# Patient Record
Sex: FEMALE | Race: WHITE | NOT HISPANIC OR LATINO | Employment: OTHER | URBAN - METROPOLITAN AREA
[De-identification: names, ages, dates, MRNs, and addresses within clinical notes are randomized per-mention and may not be internally consistent; named-entity substitution may affect disease eponyms.]

---

## 2019-01-01 ENCOUNTER — HOSPITAL ENCOUNTER (OUTPATIENT)
Facility: HOSPITAL | Age: 84
End: 2019-10-11
Attending: HOSPITALIST | Admitting: HOSPITALIST

## 2019-01-01 ENCOUNTER — HOSPITAL ENCOUNTER (OUTPATIENT)
Dept: RADIOLOGY | Facility: HOSPITAL | Age: 84
Discharge: HOME | End: 2019-09-04
Payer: MEDICARE

## 2019-01-01 ENCOUNTER — APPOINTMENT (EMERGENCY)
Dept: RADIOLOGY | Facility: HOSPITAL | Age: 84
DRG: 066 | End: 2019-01-01
Payer: MEDICARE

## 2019-01-01 ENCOUNTER — OFFICE VISIT (OUTPATIENT)
Dept: FAMILY MEDICINE CLINIC | Facility: CLINIC | Age: 84
End: 2019-01-01
Payer: MEDICARE

## 2019-01-01 ENCOUNTER — HOSPITAL ENCOUNTER (INPATIENT)
Facility: HOSPITAL | Age: 84
LOS: 1 days | DRG: 066 | End: 2019-10-11
Admitting: INTERNAL MEDICINE
Payer: MEDICARE

## 2019-01-01 ENCOUNTER — OFFICE VISIT (OUTPATIENT)
Dept: URGENT CARE | Facility: CLINIC | Age: 84
End: 2019-01-01
Payer: MEDICARE

## 2019-01-01 ENCOUNTER — APPOINTMENT (INPATIENT)
Dept: NON INVASIVE DIAGNOSTICS | Facility: HOSPITAL | Age: 84
DRG: 066 | End: 2019-01-01
Payer: MEDICARE

## 2019-01-01 ENCOUNTER — APPOINTMENT (OUTPATIENT)
Dept: LAB | Facility: HOSPITAL | Age: 84
End: 2019-01-01
Payer: MEDICARE

## 2019-01-01 ENCOUNTER — APPOINTMENT (EMERGENCY)
Dept: CT IMAGING | Facility: HOSPITAL | Age: 84
DRG: 066 | End: 2019-01-01
Payer: MEDICARE

## 2019-01-01 ENCOUNTER — TELEPHONE (OUTPATIENT)
Dept: FAMILY MEDICINE CLINIC | Facility: CLINIC | Age: 84
End: 2019-01-01

## 2019-01-01 VITALS
OXYGEN SATURATION: 96 % | SYSTOLIC BLOOD PRESSURE: 147 MMHG | WEIGHT: 140 LBS | HEART RATE: 65 BPM | TEMPERATURE: 98.1 F | DIASTOLIC BLOOD PRESSURE: 71 MMHG | BODY MASS INDEX: 24.8 KG/M2 | HEIGHT: 63 IN | RESPIRATION RATE: 16 BRPM

## 2019-01-01 VITALS
SYSTOLIC BLOOD PRESSURE: 130 MMHG | BODY MASS INDEX: 25.52 KG/M2 | WEIGHT: 144 LBS | HEIGHT: 63 IN | DIASTOLIC BLOOD PRESSURE: 68 MMHG

## 2019-01-01 VITALS
RESPIRATION RATE: 24 BRPM | OXYGEN SATURATION: 97 % | BODY MASS INDEX: 25.52 KG/M2 | SYSTOLIC BLOOD PRESSURE: 114 MMHG | HEIGHT: 63 IN | TEMPERATURE: 97.1 F | HEART RATE: 68 BPM | DIASTOLIC BLOOD PRESSURE: 70 MMHG | WEIGHT: 144 LBS

## 2019-01-01 VITALS
SYSTOLIC BLOOD PRESSURE: 119 MMHG | DIASTOLIC BLOOD PRESSURE: 72 MMHG | HEIGHT: 63 IN | OXYGEN SATURATION: 99 % | WEIGHT: 141.4 LBS | BODY MASS INDEX: 25.05 KG/M2 | HEART RATE: 65 BPM

## 2019-01-01 VITALS
SYSTOLIC BLOOD PRESSURE: 94 MMHG | TEMPERATURE: 99.2 F | OXYGEN SATURATION: 90 % | HEIGHT: 63 IN | HEART RATE: 62 BPM | WEIGHT: 140.21 LBS | RESPIRATION RATE: 24 BRPM | BODY MASS INDEX: 24.84 KG/M2 | DIASTOLIC BLOOD PRESSURE: 58 MMHG

## 2019-01-01 VITALS
BODY MASS INDEX: 25.45 KG/M2 | OXYGEN SATURATION: 98 % | SYSTOLIC BLOOD PRESSURE: 124 MMHG | TEMPERATURE: 98.9 F | HEIGHT: 63 IN | WEIGHT: 143.6 LBS | DIASTOLIC BLOOD PRESSURE: 72 MMHG | HEART RATE: 65 BPM

## 2019-01-01 DIAGNOSIS — E87.6 HYPOKALEMIA: ICD-10-CM

## 2019-01-01 DIAGNOSIS — R05.9 COUGH: ICD-10-CM

## 2019-01-01 DIAGNOSIS — E78.2 MIXED HYPERLIPIDEMIA: ICD-10-CM

## 2019-01-01 DIAGNOSIS — E11.9 TYPE 2 DIABETES MELLITUS WITHOUT COMPLICATION, WITHOUT LONG-TERM CURRENT USE OF INSULIN (HCC): Primary | ICD-10-CM

## 2019-01-01 DIAGNOSIS — J20.8 ACUTE BACTERIAL BRONCHITIS: Primary | ICD-10-CM

## 2019-01-01 DIAGNOSIS — Z51.5 END OF LIFE CARE: Primary | ICD-10-CM

## 2019-01-01 DIAGNOSIS — B34.9 VIRAL ILLNESS: Primary | ICD-10-CM

## 2019-01-01 DIAGNOSIS — B96.89 ACUTE BACTERIAL BRONCHITIS: Primary | ICD-10-CM

## 2019-01-01 DIAGNOSIS — B34.9 VIRAL SYNDROME: ICD-10-CM

## 2019-01-01 DIAGNOSIS — E06.3 HASHIMOTO'S THYROIDITIS: Primary | ICD-10-CM

## 2019-01-01 DIAGNOSIS — I10 ESSENTIAL HYPERTENSION, BENIGN: Primary | ICD-10-CM

## 2019-01-01 DIAGNOSIS — I63.9 STROKE (CEREBRUM) (HCC): Primary | ICD-10-CM

## 2019-01-01 DIAGNOSIS — E11.9 TYPE 2 DIABETES MELLITUS WITHOUT COMPLICATION, WITHOUT LONG-TERM CURRENT USE OF INSULIN (HCC): ICD-10-CM

## 2019-01-01 DIAGNOSIS — E78.2 MIXED HYPERLIPIDEMIA: Primary | ICD-10-CM

## 2019-01-01 DIAGNOSIS — R10.84 GENERALIZED ABDOMINAL PAIN: ICD-10-CM

## 2019-01-01 DIAGNOSIS — E87.6 HYPOKALEMIA: Primary | ICD-10-CM

## 2019-01-01 DIAGNOSIS — I10 ESSENTIAL HYPERTENSION, BENIGN: ICD-10-CM

## 2019-01-01 DIAGNOSIS — E83.42 HYPOMAGNESEMIA: ICD-10-CM

## 2019-01-01 LAB
ALBUMIN SERPL BCP-MCNC: 3 G/DL (ref 3.5–5.7)
ALBUMIN SERPL BCP-MCNC: 3.1 G/DL (ref 3.5–5.7)
ALBUMIN SERPL BCP-MCNC: 3.8 G/DL (ref 3.5–5.7)
ALP SERPL-CCNC: 114 U/L (ref 55–165)
ALP SERPL-CCNC: 78 U/L (ref 55–165)
ALP SERPL-CCNC: 80 U/L (ref 55–165)
ALT SERPL W P-5'-P-CCNC: 17 U/L (ref 7–52)
ALT SERPL W P-5'-P-CCNC: 20 U/L (ref 7–52)
ALT SERPL W P-5'-P-CCNC: 30 U/L (ref 7–52)
AMYLASE SERPL-CCNC: 32 IU/L (ref 29–103)
ANION GAP SERPL CALCULATED.3IONS-SCNC: 10 MMOL/L (ref 4–13)
ANION GAP SERPL CALCULATED.3IONS-SCNC: 12 MMOL/L (ref 4–13)
ANION GAP SERPL CALCULATED.3IONS-SCNC: 8 MMOL/L (ref 4–13)
ANION GAP SERPL CALCULATED.3IONS-SCNC: 9 MMOL/L (ref 4–13)
APTT PPP: 31 SECONDS (ref 23–37)
AST SERPL W P-5'-P-CCNC: 19 U/L (ref 13–39)
AST SERPL W P-5'-P-CCNC: 24 U/L (ref 13–39)
AST SERPL W P-5'-P-CCNC: 24 U/L (ref 13–39)
ATRIAL RATE: 74 BPM
BACTERIA UR QL AUTO: ABNORMAL /HPF
BASOPHILS # BLD AUTO: 0 THOUSANDS/ΜL (ref 0–0.1)
BASOPHILS # BLD AUTO: 0 THOUSANDS/ΜL (ref 0–0.1)
BASOPHILS # BLD AUTO: 0.1 THOUSANDS/ΜL (ref 0–0.1)
BASOPHILS NFR BLD AUTO: 0 % (ref 0–2)
BILIRUB SERPL-MCNC: 0.6 MG/DL (ref 0.2–1)
BILIRUB SERPL-MCNC: 0.6 MG/DL (ref 0.2–1)
BILIRUB SERPL-MCNC: 0.7 MG/DL (ref 0.2–1)
BILIRUB UR QL STRIP: ABNORMAL
BILIRUB UR QL STRIP: NEGATIVE
BUN SERPL-MCNC: 14 MG/DL (ref 7–25)
BUN SERPL-MCNC: 14 MG/DL (ref 7–25)
BUN SERPL-MCNC: 25 MG/DL (ref 7–25)
BUN SERPL-MCNC: 25 MG/DL (ref 7–25)
CALCIUM SERPL-MCNC: 8.7 MG/DL (ref 8.6–10.5)
CALCIUM SERPL-MCNC: 9.2 MG/DL (ref 8.6–10.5)
CALCIUM SERPL-MCNC: 9.3 MG/DL (ref 8.6–10.5)
CALCIUM SERPL-MCNC: 9.5 MG/DL (ref 8.6–10.5)
CHLORIDE SERPL-SCNC: 102 MMOL/L (ref 98–107)
CHLORIDE SERPL-SCNC: 108 MMOL/L (ref 98–107)
CHLORIDE SERPL-SCNC: 89 MMOL/L (ref 98–107)
CHLORIDE SERPL-SCNC: 94 MMOL/L (ref 98–107)
CHOLEST SERPL-MCNC: 79 MG/DL (ref 0–200)
CLARITY UR: CLEAR
CLARITY UR: CLEAR
CO2 SERPL-SCNC: 18 MMOL/L (ref 21–31)
CO2 SERPL-SCNC: 24 MMOL/L (ref 21–31)
CO2 SERPL-SCNC: 24 MMOL/L (ref 21–31)
CO2 SERPL-SCNC: 26 MMOL/L (ref 21–31)
COLOR UR: YELLOW
COLOR UR: YELLOW
CREAT SERPL-MCNC: 0.75 MG/DL (ref 0.6–1.2)
CREAT SERPL-MCNC: 0.82 MG/DL (ref 0.6–1.2)
CREAT SERPL-MCNC: 0.91 MG/DL (ref 0.6–1.2)
CREAT SERPL-MCNC: 0.93 MG/DL (ref 0.6–1.2)
CREAT UR-MCNC: 20.7 MG/DL
EOSINOPHIL # BLD AUTO: 0.1 THOUSAND/ΜL (ref 0–0.61)
EOSINOPHIL NFR BLD AUTO: 1 % (ref 0–5)
ERYTHROCYTE [DISTWIDTH] IN BLOOD BY AUTOMATED COUNT: 13.5 % (ref 11.5–14.5)
ERYTHROCYTE [DISTWIDTH] IN BLOOD BY AUTOMATED COUNT: 14.1 % (ref 11.5–14.5)
ERYTHROCYTE [DISTWIDTH] IN BLOOD BY AUTOMATED COUNT: 14.5 % (ref 11.5–14.5)
EST. AVERAGE GLUCOSE BLD GHB EST-MCNC: 171 MG/DL
EST. AVERAGE GLUCOSE BLD GHB EST-MCNC: 183 MG/DL
GFR SERPL CREATININE-BSD FRML MDRD: 54 ML/MIN/1.73SQ M
GFR SERPL CREATININE-BSD FRML MDRD: 56 ML/MIN/1.73SQ M
GFR SERPL CREATININE-BSD FRML MDRD: 63 ML/MIN/1.73SQ M
GFR SERPL CREATININE-BSD FRML MDRD: 70 ML/MIN/1.73SQ M
GLUCOSE SERPL-MCNC: 141 MG/DL (ref 65–140)
GLUCOSE SERPL-MCNC: 144 MG/DL (ref 65–140)
GLUCOSE SERPL-MCNC: 154 MG/DL (ref 65–140)
GLUCOSE SERPL-MCNC: 164 MG/DL (ref 65–99)
GLUCOSE SERPL-MCNC: 165 MG/DL (ref 65–140)
GLUCOSE SERPL-MCNC: 169 MG/DL (ref 65–99)
GLUCOSE SERPL-MCNC: 179 MG/DL (ref 65–140)
GLUCOSE SERPL-MCNC: 223 MG/DL (ref 65–99)
GLUCOSE SERPL-MCNC: 261 MG/DL (ref 65–99)
GLUCOSE UR STRIP-MCNC: ABNORMAL MG/DL
GLUCOSE UR STRIP-MCNC: NEGATIVE MG/DL
HBA1C MFR BLD HPLC: 8.4 %
HBA1C MFR BLD: 7.6 % (ref 4.2–6.3)
HBA1C MFR BLD: 8 % (ref 4.2–6.3)
HCT VFR BLD AUTO: 36.2 % (ref 42–47)
HCT VFR BLD AUTO: 36.7 % (ref 42–47)
HCT VFR BLD AUTO: 39.2 % (ref 42–47)
HDLC SERPL-MCNC: 22 MG/DL (ref 40–60)
HGB BLD-MCNC: 11.8 G/DL (ref 12–16)
HGB BLD-MCNC: 12.1 G/DL (ref 12–16)
HGB BLD-MCNC: 13 G/DL (ref 12–16)
HGB UR QL STRIP.AUTO: ABNORMAL
HGB UR QL STRIP.AUTO: NEGATIVE
INR PPP: 1.32 (ref 0.9–1.5)
INR PPP: 1.36 (ref 0.9–1.5)
KETONES UR STRIP-MCNC: ABNORMAL MG/DL
KETONES UR STRIP-MCNC: NEGATIVE MG/DL
LACTATE SERPL-SCNC: 1.8 MMOL/L (ref 0.5–2)
LDLC SERPL CALC-MCNC: 38 MG/DL (ref 0–100)
LEUKOCYTE ESTERASE UR QL STRIP: NEGATIVE
LEUKOCYTE ESTERASE UR QL STRIP: NEGATIVE
LIPASE SERPL-CCNC: 11 U/L (ref 11–82)
LYMPHOCYTES # BLD AUTO: 0.9 THOUSANDS/ΜL (ref 0.6–4.47)
LYMPHOCYTES # BLD AUTO: 1.2 THOUSANDS/ΜL (ref 0.6–4.47)
LYMPHOCYTES # BLD AUTO: 1.3 THOUSANDS/ΜL (ref 0.6–4.47)
LYMPHOCYTES NFR BLD AUTO: 10 % (ref 21–51)
LYMPHOCYTES NFR BLD AUTO: 7 % (ref 21–51)
LYMPHOCYTES NFR BLD AUTO: 9 % (ref 21–51)
MAGNESIUM SERPL-MCNC: 2 MG/DL (ref 1.9–2.7)
MCH RBC QN AUTO: 28.4 PG (ref 26–34)
MCH RBC QN AUTO: 28.9 PG (ref 26–34)
MCH RBC QN AUTO: 29.9 PG (ref 26–34)
MCHC RBC AUTO-ENTMCNC: 32.5 G/DL (ref 31–37)
MCHC RBC AUTO-ENTMCNC: 33 G/DL (ref 31–37)
MCHC RBC AUTO-ENTMCNC: 33.2 G/DL (ref 31–37)
MCV RBC AUTO: 87 FL (ref 81–99)
MCV RBC AUTO: 88 FL (ref 81–99)
MCV RBC AUTO: 90 FL (ref 81–99)
MICROALBUMIN UR-MCNC: 19.2 MG/L (ref 0–20)
MICROALBUMIN/CREAT 24H UR: 93 MG/G CREATININE (ref 0–30)
MONOCYTES # BLD AUTO: 0.7 THOUSAND/ΜL (ref 0.17–1.22)
MONOCYTES # BLD AUTO: 0.7 THOUSAND/ΜL (ref 0.17–1.22)
MONOCYTES # BLD AUTO: 1.1 THOUSAND/ΜL (ref 0.17–1.22)
MONOCYTES NFR BLD AUTO: 5 % (ref 2–12)
MONOCYTES NFR BLD AUTO: 6 % (ref 2–12)
MONOCYTES NFR BLD AUTO: 7 % (ref 2–12)
MUCOUS THREADS UR QL AUTO: ABNORMAL
NEUTROPHILS # BLD AUTO: 11.1 THOUSANDS/ΜL (ref 1.4–6.5)
NEUTROPHILS # BLD AUTO: 11.9 THOUSANDS/ΜL (ref 1.4–6.5)
NEUTROPHILS # BLD AUTO: 9.6 THOUSANDS/ΜL (ref 1.4–6.5)
NEUTS SEG NFR BLD AUTO: 83 % (ref 42–75)
NEUTS SEG NFR BLD AUTO: 83 % (ref 42–75)
NEUTS SEG NFR BLD AUTO: 87 % (ref 42–75)
NITRITE UR QL STRIP: NEGATIVE
NITRITE UR QL STRIP: NEGATIVE
NON-SQ EPI CELLS URNS QL MICRO: ABNORMAL /HPF
OTHER STN SPEC: ABNORMAL
P AXIS: 69 DEGREES
PH UR STRIP.AUTO: 5 [PH]
PH UR STRIP.AUTO: 6 [PH]
PLATELET # BLD AUTO: 340 THOUSANDS/UL (ref 149–390)
PLATELET # BLD AUTO: 342 THOUSANDS/UL (ref 149–390)
PLATELET # BLD AUTO: 350 THOUSANDS/UL (ref 149–390)
PMV BLD AUTO: 7.3 FL (ref 8.6–11.7)
PMV BLD AUTO: 7.9 FL (ref 8.6–11.7)
PMV BLD AUTO: 8.1 FL (ref 8.6–11.7)
POTASSIUM SERPL-SCNC: 3.4 MMOL/L (ref 3.5–5.5)
POTASSIUM SERPL-SCNC: 3.8 MMOL/L (ref 3.5–5.5)
POTASSIUM SERPL-SCNC: 4 MMOL/L (ref 3.5–5.5)
POTASSIUM SERPL-SCNC: 4.3 MMOL/L (ref 3.5–5.5)
PR INTERVAL: 166 MS
PROCALCITONIN SERPL-MCNC: 0.72 NG/ML
PROT SERPL-MCNC: 6.6 G/DL (ref 6.4–8.9)
PROT SERPL-MCNC: 7.1 G/DL (ref 6.4–8.9)
PROT SERPL-MCNC: 7.4 G/DL (ref 6.4–8.9)
PROT UR STRIP-MCNC: ABNORMAL MG/DL
PROT UR STRIP-MCNC: NEGATIVE MG/DL
PROTHROMBIN TIME: 15.4 SECONDS (ref 10.2–13)
PROTHROMBIN TIME: 15.8 SECONDS (ref 10.2–13)
QRS AXIS: 77 DEGREES
QRSD INTERVAL: 74 MS
QT INTERVAL: 398 MS
QTC INTERVAL: 441 MS
RBC # BLD AUTO: 4.14 MILLION/UL (ref 3.9–5.2)
RBC # BLD AUTO: 4.18 MILLION/UL (ref 3.9–5.2)
RBC # BLD AUTO: 4.35 MILLION/UL (ref 3.9–5.2)
RBC #/AREA URNS AUTO: ABNORMAL /HPF
SODIUM SERPL-SCNC: 123 MMOL/L (ref 134–143)
SODIUM SERPL-SCNC: 128 MMOL/L (ref 134–143)
SODIUM SERPL-SCNC: 135 MMOL/L (ref 134–143)
SODIUM SERPL-SCNC: 138 MMOL/L (ref 134–143)
SP GR UR STRIP.AUTO: 1.01 (ref 1–1.03)
SP GR UR STRIP.AUTO: 1.02 (ref 1–1.03)
T WAVE AXIS: 85 DEGREES
TRIGL SERPL-MCNC: 95 MG/DL (ref 44–166)
TSH SERPL DL<=0.05 MIU/L-ACNC: 3.6 UIU/ML (ref 0.45–5.33)
UROBILINOGEN UR QL STRIP.AUTO: 1 E.U./DL
UROBILINOGEN UR QL STRIP.AUTO: 1 E.U./DL
VENTRICULAR RATE: 74 BPM
WBC # BLD AUTO: 11.6 THOUSAND/UL (ref 4.8–10.8)
WBC # BLD AUTO: 12.8 THOUSAND/UL (ref 4.8–10.8)
WBC # BLD AUTO: 14.4 THOUSAND/UL (ref 4.8–10.8)
WBC #/AREA URNS AUTO: ABNORMAL /HPF

## 2019-01-01 PROCEDURE — 80061 LIPID PANEL: CPT | Performed by: NURSE PRACTITIONER

## 2019-01-01 PROCEDURE — 80048 BASIC METABOLIC PNL TOTAL CA: CPT

## 2019-01-01 PROCEDURE — 83735 ASSAY OF MAGNESIUM: CPT

## 2019-01-01 PROCEDURE — 83036 HEMOGLOBIN GLYCOSYLATED A1C: CPT

## 2019-01-01 PROCEDURE — 70450 CT HEAD/BRAIN W/O DYE: CPT

## 2019-01-01 PROCEDURE — 97530 THERAPEUTIC ACTIVITIES: CPT

## 2019-01-01 PROCEDURE — 84443 ASSAY THYROID STIM HORMONE: CPT | Performed by: NURSE PRACTITIONER

## 2019-01-01 PROCEDURE — G8988 SELF CARE GOAL STATUS: HCPCS

## 2019-01-01 PROCEDURE — 96360 HYDRATION IV INFUSION INIT: CPT

## 2019-01-01 PROCEDURE — 82570 ASSAY OF URINE CREATININE: CPT | Performed by: FAMILY MEDICINE

## 2019-01-01 PROCEDURE — 80053 COMPREHEN METABOLIC PANEL: CPT | Performed by: NURSE PRACTITIONER

## 2019-01-01 PROCEDURE — 81003 URINALYSIS AUTO W/O SCOPE: CPT | Performed by: FAMILY MEDICINE

## 2019-01-01 PROCEDURE — 84145 PROCALCITONIN (PCT): CPT | Performed by: PHYSICIAN ASSISTANT

## 2019-01-01 PROCEDURE — 85025 COMPLETE CBC W/AUTO DIFF WBC: CPT

## 2019-01-01 PROCEDURE — 71045 X-RAY EXAM CHEST 1 VIEW: CPT

## 2019-01-01 PROCEDURE — 36415 COLL VENOUS BLD VENIPUNCTURE: CPT

## 2019-01-01 PROCEDURE — 94664 DEMO&/EVAL PT USE INHALER: CPT

## 2019-01-01 PROCEDURE — 99213 OFFICE O/P EST LOW 20 MIN: CPT | Performed by: FAMILY MEDICINE

## 2019-01-01 PROCEDURE — 85610 PROTHROMBIN TIME: CPT | Performed by: NURSE PRACTITIONER

## 2019-01-01 PROCEDURE — 82948 REAGENT STRIP/BLOOD GLUCOSE: CPT

## 2019-01-01 PROCEDURE — 93306 TTE W/DOPPLER COMPLETE: CPT | Performed by: INTERNAL MEDICINE

## 2019-01-01 PROCEDURE — 82150 ASSAY OF AMYLASE: CPT

## 2019-01-01 PROCEDURE — 80053 COMPREHEN METABOLIC PANEL: CPT

## 2019-01-01 PROCEDURE — 82043 UR ALBUMIN QUANTITATIVE: CPT | Performed by: FAMILY MEDICINE

## 2019-01-01 PROCEDURE — 81001 URINALYSIS AUTO W/SCOPE: CPT | Performed by: NURSE PRACTITIONER

## 2019-01-01 PROCEDURE — 83036 HEMOGLOBIN GLYCOSYLATED A1C: CPT | Performed by: NURSE PRACTITIONER

## 2019-01-01 PROCEDURE — 99221 1ST HOSP IP/OBS SF/LOW 40: CPT | Performed by: NURSE PRACTITIONER

## 2019-01-01 PROCEDURE — 71046 X-RAY EXAM CHEST 2 VIEWS: CPT

## 2019-01-01 PROCEDURE — 99213 OFFICE O/P EST LOW 20 MIN: CPT | Performed by: PHYSICIAN ASSISTANT

## 2019-01-01 PROCEDURE — G8979 MOBILITY GOAL STATUS: HCPCS

## 2019-01-01 PROCEDURE — 87086 URINE CULTURE/COLONY COUNT: CPT | Performed by: NURSE PRACTITIONER

## 2019-01-01 PROCEDURE — 99239 HOSP IP/OBS DSCHRG MGMT >30: CPT | Performed by: NURSE PRACTITIONER

## 2019-01-01 PROCEDURE — 94760 N-INVAS EAR/PLS OXIMETRY 1: CPT

## 2019-01-01 PROCEDURE — 99203 OFFICE O/P NEW LOW 30 MIN: CPT | Performed by: PHYSICIAN ASSISTANT

## 2019-01-01 PROCEDURE — 83605 ASSAY OF LACTIC ACID: CPT

## 2019-01-01 PROCEDURE — 93005 ELECTROCARDIOGRAM TRACING: CPT

## 2019-01-01 PROCEDURE — 87040 BLOOD CULTURE FOR BACTERIA: CPT

## 2019-01-01 PROCEDURE — 99238 HOSP IP/OBS DSCHRG MGMT 30/<: CPT | Performed by: NURSE PRACTITIONER

## 2019-01-01 PROCEDURE — G8978 MOBILITY CURRENT STATUS: HCPCS

## 2019-01-01 PROCEDURE — 99285 EMERGENCY DEPT VISIT HI MDM: CPT

## 2019-01-01 PROCEDURE — 99223 1ST HOSP IP/OBS HIGH 75: CPT | Performed by: NURSE PRACTITIONER

## 2019-01-01 PROCEDURE — 93010 ELECTROCARDIOGRAM REPORT: CPT | Performed by: INTERNAL MEDICINE

## 2019-01-01 PROCEDURE — 99233 SBSQ HOSP IP/OBS HIGH 50: CPT | Performed by: HOSPITALIST

## 2019-01-01 PROCEDURE — 85610 PROTHROMBIN TIME: CPT

## 2019-01-01 PROCEDURE — 97163 PT EVAL HIGH COMPLEX 45 MIN: CPT

## 2019-01-01 PROCEDURE — G0463 HOSPITAL OUTPT CLINIC VISIT: HCPCS | Performed by: PHYSICIAN ASSISTANT

## 2019-01-01 PROCEDURE — 83690 ASSAY OF LIPASE: CPT

## 2019-01-01 PROCEDURE — G8987 SELF CARE CURRENT STATUS: HCPCS

## 2019-01-01 PROCEDURE — 85730 THROMBOPLASTIN TIME PARTIAL: CPT

## 2019-01-01 PROCEDURE — 85025 COMPLETE CBC W/AUTO DIFF WBC: CPT | Performed by: NURSE PRACTITIONER

## 2019-01-01 PROCEDURE — 93306 TTE W/DOPPLER COMPLETE: CPT

## 2019-01-01 PROCEDURE — 97167 OT EVAL HIGH COMPLEX 60 MIN: CPT

## 2019-01-01 RX ORDER — SCOLOPAMINE TRANSDERMAL SYSTEM 1 MG/1
1 PATCH, EXTENDED RELEASE TRANSDERMAL
Status: DISCONTINUED | OUTPATIENT
Start: 2019-01-01 | End: 2019-10-12 | Stop reason: HOSPADM

## 2019-01-01 RX ORDER — HALOPERIDOL 5 MG/ML
5 INJECTION INTRAMUSCULAR EVERY 6 HOURS
Qty: 1 ML | Refills: 0
Start: 2019-01-01

## 2019-01-01 RX ORDER — HALOPERIDOL 5 MG/ML
1 INJECTION INTRAMUSCULAR
Status: DISCONTINUED | OUTPATIENT
Start: 2019-01-01 | End: 2019-10-12 | Stop reason: HOSPADM

## 2019-01-01 RX ORDER — HALOPERIDOL 5 MG/ML
5 INJECTION INTRAMUSCULAR EVERY 6 HOURS
Status: DISCONTINUED | OUTPATIENT
Start: 2019-01-01 | End: 2019-10-12 | Stop reason: HOSPADM

## 2019-01-01 RX ORDER — ASPIRIN 81 MG/1
81 TABLET, CHEWABLE ORAL DAILY
Status: DISCONTINUED | OUTPATIENT
Start: 2019-01-01 | End: 2019-01-01

## 2019-01-01 RX ORDER — LORAZEPAM 2 MG/ML
2 INJECTION INTRAMUSCULAR
Refills: 0
Start: 2019-01-01 | End: 2019-10-21

## 2019-01-01 RX ORDER — PRAVASTATIN SODIUM 80 MG/1
TABLET ORAL
Qty: 30 TABLET | Refills: 5 | Status: SHIPPED | OUTPATIENT
Start: 2019-01-01 | End: 2019-01-01 | Stop reason: HOSPADM

## 2019-01-01 RX ORDER — ATORVASTATIN CALCIUM 40 MG/1
40 TABLET, FILM COATED ORAL EVERY EVENING
Status: DISCONTINUED | OUTPATIENT
Start: 2019-01-01 | End: 2019-01-01

## 2019-01-01 RX ORDER — HEPARIN SODIUM 5000 [USP'U]/ML
5000 INJECTION, SOLUTION INTRAVENOUS; SUBCUTANEOUS EVERY 8 HOURS SCHEDULED
Status: DISCONTINUED | OUTPATIENT
Start: 2019-01-01 | End: 2019-01-01

## 2019-01-01 RX ORDER — OLMESARTAN MEDOXOMIL AND HYDROCHLOROTHIAZIDE 20/12.5 20; 12.5 MG/1; MG/1
TABLET ORAL
Qty: 30 TABLET | Refills: 5 | Status: SHIPPED | OUTPATIENT
Start: 2019-01-01 | End: 2019-01-01 | Stop reason: DRUGHIGH

## 2019-01-01 RX ORDER — OLMESARTAN MEDOXOMIL AND HYDROCHLOROTHIAZIDE 20/12.5 20; 12.5 MG/1; MG/1
TABLET ORAL
Refills: 4 | COMMUNITY
Start: 2019-01-01 | End: 2019-01-01 | Stop reason: SDUPTHER

## 2019-01-01 RX ORDER — NETARSUDIL 0.2 MG/ML
1 SOLUTION/ DROPS OPHTHALMIC; TOPICAL ONCE
Refills: 3 | COMMUNITY
Start: 2019-01-01 | End: 2019-01-01 | Stop reason: HOSPADM

## 2019-01-01 RX ORDER — LORAZEPAM 2 MG/ML
2 INJECTION INTRAMUSCULAR
Status: DISCONTINUED | OUTPATIENT
Start: 2019-01-01 | End: 2019-01-01 | Stop reason: HOSPADM

## 2019-01-01 RX ORDER — ASPIRIN 300 MG/1
300 SUPPOSITORY RECTAL ONCE
Status: COMPLETED | OUTPATIENT
Start: 2019-01-01 | End: 2019-01-01

## 2019-01-01 RX ORDER — ACETAMINOPHEN 325 MG/1
650 TABLET ORAL EVERY 6 HOURS PRN
Status: DISCONTINUED | OUTPATIENT
Start: 2019-01-01 | End: 2019-01-01

## 2019-01-01 RX ORDER — AZITHROMYCIN 250 MG/1
TABLET, FILM COATED ORAL
Qty: 6 TABLET | Refills: 0 | Status: SHIPPED | OUTPATIENT
Start: 2019-01-01 | End: 2019-01-01

## 2019-01-01 RX ORDER — SCOLOPAMINE TRANSDERMAL SYSTEM 1 MG/1
1 PATCH, EXTENDED RELEASE TRANSDERMAL
Refills: 0
Start: 2019-10-14

## 2019-01-01 RX ORDER — POTASSIUM CHLORIDE 750 MG/1
10 TABLET, EXTENDED RELEASE ORAL DAILY
Refills: 5 | Status: ON HOLD | COMMUNITY
Start: 2019-01-01 | End: 2019-01-01

## 2019-01-01 RX ORDER — HALOPERIDOL 5 MG/ML
1 INJECTION INTRAMUSCULAR
Qty: 1 ML | Refills: 0
Start: 2019-01-01

## 2019-01-01 RX ORDER — PREDNISOLONE ACETATE 10 MG/ML
SUSPENSION/ DROPS OPHTHALMIC
Refills: 3 | COMMUNITY
Start: 2019-01-01 | End: 2019-01-01 | Stop reason: ALTCHOICE

## 2019-01-01 RX ORDER — LEVOTHYROXINE SODIUM 0.07 MG/1
TABLET ORAL
Refills: 4 | COMMUNITY
Start: 2019-01-01 | End: 2019-01-01 | Stop reason: SDUPTHER

## 2019-01-01 RX ORDER — POTASSIUM CHLORIDE 750 MG/1
10 TABLET, EXTENDED RELEASE ORAL DAILY
Status: DISCONTINUED | OUTPATIENT
Start: 2019-01-01 | End: 2019-01-01

## 2019-01-01 RX ORDER — BIMATOPROST 0.01 %
1 DROPS OPHTHALMIC (EYE)
Refills: 3 | COMMUNITY
Start: 2019-01-01 | End: 2019-01-01 | Stop reason: HOSPADM

## 2019-01-01 RX ORDER — GATIFLOXACIN 5 MG/ML
SOLUTION/ DROPS OPHTHALMIC
Refills: 2 | COMMUNITY
Start: 2019-01-01 | End: 2019-01-01 | Stop reason: ALTCHOICE

## 2019-01-01 RX ORDER — MORPHINE SULFATE 4 MG/ML
4 INJECTION, SOLUTION INTRAMUSCULAR; INTRAVENOUS EVERY 4 HOURS
Status: DISCONTINUED | OUTPATIENT
Start: 2019-01-01 | End: 2019-10-12 | Stop reason: HOSPADM

## 2019-01-01 RX ORDER — POTASSIUM CHLORIDE 750 MG/1
TABLET, EXTENDED RELEASE ORAL
Qty: 30 TABLET | Refills: 5 | Status: SHIPPED | OUTPATIENT
Start: 2019-01-01 | End: 2019-01-01 | Stop reason: SDUPTHER

## 2019-01-01 RX ORDER — PRAVASTATIN SODIUM 80 MG/1
80 TABLET ORAL DAILY
Refills: 4 | COMMUNITY
Start: 2019-01-01 | End: 2019-01-01 | Stop reason: SDUPTHER

## 2019-01-01 RX ORDER — BRIMONIDINE TARTRATE 0.15 %
1 DROPS OPHTHALMIC (EYE) 3 TIMES DAILY
Status: DISCONTINUED | OUTPATIENT
Start: 2019-01-01 | End: 2019-01-01

## 2019-01-01 RX ORDER — SCOLOPAMINE TRANSDERMAL SYSTEM 1 MG/1
1 PATCH, EXTENDED RELEASE TRANSDERMAL
Status: DISCONTINUED | OUTPATIENT
Start: 2019-01-01 | End: 2019-01-01 | Stop reason: HOSPADM

## 2019-01-01 RX ORDER — BRIMONIDINE TARTRATE 0.1 %
1 DROPS OPHTHALMIC (EYE) 4 TIMES DAILY
Refills: 2 | COMMUNITY
Start: 2019-01-01 | End: 2019-01-01 | Stop reason: HOSPADM

## 2019-01-01 RX ORDER — LORAZEPAM 2 MG/ML
2 INJECTION INTRAMUSCULAR
Status: DISCONTINUED | OUTPATIENT
Start: 2019-01-01 | End: 2019-10-12 | Stop reason: HOSPADM

## 2019-01-01 RX ORDER — CYCLOSPORINE 0.5 MG/ML
1 EMULSION OPHTHALMIC ONCE
Refills: 2 | COMMUNITY
Start: 2019-01-01

## 2019-01-01 RX ORDER — LORAZEPAM 2 MG/ML
2 INJECTION INTRAMUSCULAR EVERY 4 HOURS
Status: DISCONTINUED | OUTPATIENT
Start: 2019-01-01 | End: 2019-10-12 | Stop reason: HOSPADM

## 2019-01-01 RX ORDER — ONDANSETRON 2 MG/ML
4 INJECTION INTRAMUSCULAR; INTRAVENOUS EVERY 6 HOURS PRN
Status: DISCONTINUED | OUTPATIENT
Start: 2019-01-01 | End: 2019-01-01

## 2019-01-01 RX ORDER — ASPIRIN 300 MG/1
300 SUPPOSITORY RECTAL DAILY
Status: DISCONTINUED | OUTPATIENT
Start: 2019-01-01 | End: 2019-01-01

## 2019-01-01 RX ORDER — LEVOTHYROXINE SODIUM 0.07 MG/1
75 TABLET ORAL
Status: DISCONTINUED | OUTPATIENT
Start: 2019-01-01 | End: 2019-01-01

## 2019-01-01 RX ORDER — DORZOLAMIDE HCL 20 MG/ML
SOLUTION/ DROPS OPHTHALMIC
Refills: 2 | COMMUNITY
Start: 2019-01-01 | End: 2019-01-01 | Stop reason: ALTCHOICE

## 2019-01-01 RX ORDER — MORPHINE SULFATE 4 MG/ML
4 INJECTION, SOLUTION INTRAMUSCULAR; INTRAVENOUS
Refills: 0
Start: 2019-01-01 | End: 2019-10-21

## 2019-01-01 RX ORDER — LORAZEPAM 2 MG/ML
2 INJECTION INTRAMUSCULAR EVERY 4 HOURS
Qty: 60 ML | Refills: 0
Start: 2019-01-01 | End: 2019-10-21

## 2019-01-01 RX ORDER — LEVOTHYROXINE SODIUM 0.07 MG/1
75 TABLET ORAL
Qty: 30 TABLET | Refills: 5 | Status: SHIPPED | OUTPATIENT
Start: 2019-01-01 | End: 2019-01-01 | Stop reason: HOSPADM

## 2019-01-01 RX ORDER — SODIUM CHLORIDE 9 MG/ML
75 INJECTION, SOLUTION INTRAVENOUS CONTINUOUS
Status: DISCONTINUED | OUTPATIENT
Start: 2019-01-01 | End: 2019-01-01

## 2019-01-01 RX ORDER — LOSARTAN POTASSIUM 50 MG/1
100 TABLET ORAL DAILY
Status: DISCONTINUED | OUTPATIENT
Start: 2019-01-01 | End: 2019-01-01

## 2019-01-01 RX ORDER — ASPIRIN 81 MG/1
81 TABLET, CHEWABLE ORAL DAILY
Status: DISCONTINUED | OUTPATIENT
Start: 2019-01-01 | End: 2019-01-01 | Stop reason: SDUPTHER

## 2019-01-01 RX ORDER — MORPHINE SULFATE 4 MG/ML
4 INJECTION, SOLUTION INTRAMUSCULAR; INTRAVENOUS EVERY 4 HOURS
Qty: 60 ML | Refills: 0 | Status: SHIPPED | OUTPATIENT
Start: 2019-01-01 | End: 2019-10-21

## 2019-01-01 RX ORDER — OLMESARTAN MEDOXOMIL 40 MG/1
40 TABLET ORAL DAILY
Qty: 30 TABLET | Refills: 5 | Status: SHIPPED | OUTPATIENT
Start: 2019-01-01 | End: 2019-01-01 | Stop reason: HOSPADM

## 2019-01-01 RX ORDER — PRAVASTATIN SODIUM 40 MG
80 TABLET ORAL
Status: DISCONTINUED | OUTPATIENT
Start: 2019-01-01 | End: 2019-01-01

## 2019-01-01 RX ORDER — MORPHINE SULFATE 4 MG/ML
4 INJECTION, SOLUTION INTRAMUSCULAR; INTRAVENOUS
Status: DISCONTINUED | OUTPATIENT
Start: 2019-01-01 | End: 2019-10-12 | Stop reason: HOSPADM

## 2019-01-01 RX ADMIN — LORAZEPAM 2 MG: 2 INJECTION INTRAMUSCULAR; INTRAVENOUS at 15:23

## 2019-01-01 RX ADMIN — SODIUM CHLORIDE 75 ML/HR: 9 INJECTION, SOLUTION INTRAVENOUS at 07:27

## 2019-01-01 RX ADMIN — SCOPALAMINE 1 PATCH: 1 PATCH, EXTENDED RELEASE TRANSDERMAL at 10:27

## 2019-01-01 RX ADMIN — BRIMONIDINE TARTRATE 1 DROP: 1.5 SOLUTION OPHTHALMIC at 18:36

## 2019-01-01 RX ADMIN — MORPHINE SULFATE 4 MG: 4 INJECTION INTRAVENOUS at 17:59

## 2019-01-01 RX ADMIN — HALOPERIDOL LACTATE 5 MG: 5 INJECTION, SOLUTION INTRAMUSCULAR at 17:59

## 2019-01-01 RX ADMIN — SODIUM CHLORIDE 1000 ML: 0.9 INJECTION, SOLUTION INTRAVENOUS at 14:00

## 2019-01-01 RX ADMIN — BRIMONIDINE TARTRATE 1 DROP: 1.5 SOLUTION OPHTHALMIC at 21:40

## 2019-01-01 RX ADMIN — HEPARIN SODIUM 5000 UNITS: 5000 INJECTION, SOLUTION INTRAVENOUS; SUBCUTANEOUS at 21:40

## 2019-01-01 RX ADMIN — SODIUM CHLORIDE 75 ML/HR: 9 INJECTION, SOLUTION INTRAVENOUS at 17:00

## 2019-01-01 RX ADMIN — HEPARIN SODIUM 5000 UNITS: 5000 INJECTION, SOLUTION INTRAVENOUS; SUBCUTANEOUS at 17:04

## 2019-01-01 RX ADMIN — SCOPALAMINE 1 PATCH: 1 PATCH, EXTENDED RELEASE TRANSDERMAL at 17:51

## 2019-01-01 RX ADMIN — ASPIRIN 300 MG: 300 SUPPOSITORY RECTAL at 15:05

## 2019-01-01 RX ADMIN — BIMATOPROST 1 DROP: 0.1 SOLUTION/ DROPS OPHTHALMIC at 21:40

## 2019-01-01 RX ADMIN — SODIUM CHLORIDE 125 ML/HR: 9 INJECTION, SOLUTION INTRAVENOUS at 15:04

## 2019-01-01 RX ADMIN — MORPHINE SULFATE 2 MG: 2 INJECTION, SOLUTION INTRAMUSCULAR; INTRAVENOUS at 16:24

## 2019-01-01 RX ADMIN — HEPARIN SODIUM 5000 UNITS: 5000 INJECTION, SOLUTION INTRAVENOUS; SUBCUTANEOUS at 05:16

## 2019-01-01 RX ADMIN — LORAZEPAM 2 MG: 2 INJECTION INTRAMUSCULAR; INTRAVENOUS at 17:59

## 2019-01-01 RX ADMIN — LORAZEPAM 2 MG: 2 INJECTION INTRAMUSCULAR; INTRAVENOUS at 16:24

## 2019-03-23 PROBLEM — B96.89 ACUTE BACTERIAL BRONCHITIS: Status: ACTIVE | Noted: 2019-01-01

## 2019-03-23 PROBLEM — J20.8 ACUTE BACTERIAL BRONCHITIS: Status: ACTIVE | Noted: 2019-01-01

## 2019-03-23 NOTE — PATIENT INSTRUCTIONS
Take azithromycin as prescribed  Take plain mucinex   May use Coricidin HBP for congestion  Drink fluids

## 2019-03-23 NOTE — PROGRESS NOTES
[facility]        NAME: April Johnston is a 80 y o  female  : 10/10/1928    MRN: [id number]  DATE: 2019  TIME: 1:09 PM    Assessment and Plan   Acute bacterial bronchitis [J20 8, B96 89]  1  Acute bacterial bronchitis  azithromycin (ZITHROMAX Z-MINDY) 250 mg tablet         Patient Instructions     Patient Instructions   Take azithromycin as prescribed  Take plain mucinex  May use Coricidin HBP for congestion  Drink fluids        Follow up with PCP in 3-5 days  Proceed to  ER if symptoms worsen  Chief Complaint     Chief Complaint   Patient presents with    Cough     1 week    Chest Pain     1 week          History of Present Illness       59-year-old female with history of hypothyroidism, hypertension and hyperlipidemia presents with knees complaining of cough and chest tightness for 1 week  She reports symptoms are getting worse  Feels like cough should be productive but unable to bring anything up  Feeling more fatigued than usual  Has associated congestion, scratchy throat but no fever, chills, SOB, ear pain, facial pain, pain with deep breathing, leg swelling  Lives alone  Has been using robitussin with minimal relief  No hx asthma or copd  Quit smoking 30 years ago      Review of Systems   Review of Systems   Constitutional: Positive for appetite change and fatigue  Negative for chills and fever  HENT: Positive for congestion, postnasal drip, rhinorrhea and sore throat  Negative for ear pain, facial swelling, hearing loss, sinus pressure, sinus pain and voice change  Eyes: Negative for pain, redness and visual disturbance  Respiratory: Positive for cough  Negative for chest tightness, shortness of breath and wheezing  Cardiovascular: Negative for chest pain, palpitations and leg swelling  Gastrointestinal: Negative for abdominal pain, constipation, diarrhea, nausea and vomiting  Genitourinary: Negative for difficulty urinating, dysuria and frequency     Musculoskeletal: Negative for myalgias  Skin: Negative for color change, pallor and wound  Neurological: Negative for dizziness, syncope, numbness and headaches  Hematological: Negative for adenopathy  Current Medications       Current Outpatient Medications:     ALPHAGAN P 0 1 %, , Disp: , Rfl: 2    dorzolamide (TRUSOPT) 2 % ophthalmic solution, , Disp: , Rfl: 2    gatifloxacin (ZYMAXID) 0 5 %, , Disp: , Rfl: 2    levothyroxine 75 mcg tablet, , Disp: , Rfl: 4    LUMIGAN 0 01 % ophthalmic drops, , Disp: , Rfl: 3    olmesartan-hydrochlorothiazide (BENICAR HCT) 20-12 5 MG per tablet, , Disp: , Rfl: 4    potassium chloride (K-DUR,KLOR-CON) 10 mEq tablet, , Disp: , Rfl: 5    pravastatin (PRAVACHOL) 80 mg tablet, , Disp: , Rfl: 4    prednisoLONE acetate (PRED FORTE) 1 % ophthalmic suspension, , Disp: , Rfl: 3    RESTASIS 0 05 % ophthalmic emulsion, , Disp: , Rfl: 2    RHOPRESSA 0 02 % SOLN, , Disp: , Rfl: 3    TOBRADEX ophthalmic ointment, , Disp: , Rfl: 2    azithromycin (ZITHROMAX Z-MINDY) 250 mg tablet, Take 2 tablets today then 1 tablet daily until gone, Disp: 6 tablet, Rfl: 0    Current Allergies     Allergies as of 03/23/2019    (No Known Allergies)            The following portions of the patient's history were reviewed and updated as appropriate: allergies, current medications, past family history, past medical history, past social history, past surgical history and problem list      Past Medical History:   Diagnosis Date    Disease of thyroid gland     Glaucoma     Hypertension        History reviewed  No pertinent surgical history  History reviewed  No pertinent family history  Medications have been verified  Objective   /71   Pulse 65   Temp 98 1 °F (36 7 °C)   Resp 16   Ht 5' 3" (1 6 m)   Wt 63 5 kg (140 lb)   SpO2 96%   BMI 24 80 kg/m²        Physical Exam     Physical Exam   Constitutional: She is oriented to person, place, and time   She appears well-developed and well-nourished  No distress  HENT:   Head: Normocephalic and atraumatic  Right Ear: Hearing, tympanic membrane, external ear and ear canal normal  No tenderness  No middle ear effusion  No decreased hearing is noted  Left Ear: Hearing, tympanic membrane, external ear and ear canal normal    Nose: Mucosal edema and rhinorrhea present  Right sinus exhibits no maxillary sinus tenderness and no frontal sinus tenderness  Left sinus exhibits no maxillary sinus tenderness and no frontal sinus tenderness  Mouth/Throat: Mucous membranes are normal  No uvula swelling  Posterior oropharyngeal erythema present  No oropharyngeal exudate, posterior oropharyngeal edema or tonsillar abscesses  Post nasal drip present   Eyes: Pupils are equal, round, and reactive to light  Conjunctivae and EOM are normal  Right eye exhibits no discharge  Left eye exhibits no discharge  No scleral icterus  Neck: Normal range of motion  Neck supple  Cardiovascular: Normal rate, regular rhythm and normal heart sounds  Exam reveals no gallop and no friction rub  No murmur heard  Pulmonary/Chest: Effort normal and breath sounds normal  No respiratory distress  She has no wheezes  She has no rales  Musculoskeletal: Normal range of motion  Lymphadenopathy:     She has no cervical adenopathy  Neurological: She is alert and oriented to person, place, and time  No cranial nerve deficit  Skin: Skin is warm and dry  No rash noted  She is not diaphoretic  No erythema  No pallor

## 2019-07-08 PROBLEM — J20.8 ACUTE BACTERIAL BRONCHITIS: Status: RESOLVED | Noted: 2019-01-01 | Resolved: 2019-01-01

## 2019-07-08 PROBLEM — I10 ESSENTIAL HYPERTENSION, BENIGN: Status: ACTIVE | Noted: 2019-01-01

## 2019-07-08 PROBLEM — E87.6 HYPOKALEMIA: Status: ACTIVE | Noted: 2019-01-01

## 2019-07-08 PROBLEM — E78.2 MIXED HYPERLIPIDEMIA: Status: ACTIVE | Noted: 2019-01-01

## 2019-07-08 PROBLEM — B96.89 ACUTE BACTERIAL BRONCHITIS: Status: RESOLVED | Noted: 2019-01-01 | Resolved: 2019-01-01

## 2019-07-08 PROBLEM — E06.3 HASHIMOTO'S THYROIDITIS: Status: ACTIVE | Noted: 2019-01-01

## 2019-07-08 PROBLEM — E11.9 TYPE 2 DIABETES MELLITUS WITHOUT COMPLICATION, WITHOUT LONG-TERM CURRENT USE OF INSULIN (HCC): Status: ACTIVE | Noted: 2019-01-01

## 2019-07-08 NOTE — ASSESSMENT & PLAN NOTE
Patient has hyperlipidemia  A direct LDL cholesterol has been ordered  She will continue pravastatin 80 mg daily  Her goal LDL cholesterol is less than 70

## 2019-07-08 NOTE — ASSESSMENT & PLAN NOTE
Patient has hypothyroidism  Patient will continue levothyroxine 75 mcg daily  Function testing is also pending

## 2019-07-08 NOTE — ASSESSMENT & PLAN NOTE
No results found for: HGBA1C    No results for input(s): POCGLU in the last 72 hours  Blood Sugar Average: Last 72 hrs:    Has type 2 diabetes mellitus  She is currently diet controlled  Her hemoglobin A1c has been going up over the past several years  Her last hemoglobin A1c was 6 9%  Patient did not want medication  I contacted [facility] labs  They are to fax over her blood test results  By the time the patient left, I still do not have these results  If her hemoglobin A1c is 7 0 or above, I think we should initiate therapy with medication  Patient has been instructed to continue to watch her diet and try to walk as much as possible and stay active

## 2019-07-08 NOTE — ASSESSMENT & PLAN NOTE
Patient has hypertension  Systolic blood pressure is a little higher than I would like to see  We discussed this  For now, I did not make any changes  She will continue Benicar-Dyazide 20/12 5 daily

## 2019-08-27 NOTE — PROGRESS NOTES
[email or web address]    NAME: Reuben Gerardo is a 80 y o  female  : 10/10/1928    MRN: [id number]  DATE: 2019  TIME: 1:08 PM    Assessment and Plan   Viral illness [B34 9]  1  Viral illness         Patient Instructions     Patient Instructions   Recommend to continue hydration, increase diet as tolerated  If not improving over the next 2-3 days, follow up with PCP  Chief Complaint     Chief Complaint   Patient presents with    Diarrhea     once 4 days ago  Since then has no appetite with abdominal pain that comes and goes       History of Present Illness   61-year-old female here with her daughter  Patient had episode of diarrhea 4 days ago  Since then has not had diarrhea but has slight upset stomach  No vomiting  Has been feeling more tired and has had a decreased appetite  She is still eating things such as toast and soups but does not have an appetite for anything more than that  Is drinking 3-4 bottles of water a day  Denies abdominal pain but states that her stomach feels upset and kind of crampy  No fever or chills  No shortness of breath  Has had a dry cough for the last week  Review of Systems   Review of Systems   Constitutional: Positive for appetite change and fatigue  Negative for chills and fever  HENT: Negative for congestion, ear discharge, ear pain, facial swelling, postnasal drip, sinus pressure, sneezing and sore throat  Respiratory: Positive for cough  Negative for shortness of breath and wheezing  Gastrointestinal: Positive for diarrhea and nausea  Negative for abdominal pain and vomiting  Neurological: Negative for headaches         Current Medications     Current Outpatient Medications:     ALPHAGAN P 0 1 %, , Disp: , Rfl: 2    levothyroxine 75 mcg tablet, Take 1 tablet (75 mcg total) by mouth daily in the early morning, Disp: 30 tablet, Rfl: 5    LUMIGAN 0 01 % ophthalmic drops, , Disp: , Rfl: 3    potassium chloride (K-DUR,KLOR-CON) 10 mEq tablet, , Disp: , Rfl: 5    pravastatin (PRAVACHOL) 80 mg tablet, , Disp: , Rfl: 4    RESTASIS 0 05 % ophthalmic emulsion, , Disp: , Rfl: 2    RHOPRESSA 0 02 % SOLN, , Disp: , Rfl: 3    olmesartan-hydrochlorothiazide (BENICAR HCT) 20-12 5 MG per tablet, TAKE ONE TABLET EVERY DAY SUB FOR BENICAR HCTZ, Disp: 30 tablet, Rfl: 5    Current Allergies     Allergies as of 2019    (No Known Allergies)          The following portions of the patient's history were reviewed and updated as appropriate: allergies, current medications, past family history, past medical history, past social history, past surgical history and problem list    Past Medical History:   Diagnosis Date    Disease of thyroid gland     Glaucoma     Hypertension     Lumbosacral radiculopathy     last assessed: 2013     Past Surgical History:   Procedure Laterality Date    APPENDECTOMY      HYSTERECTOMY       Family History   Problem Relation Age of Onset    Colon cancer Mother     No Known Problems Father      Social History     Socioeconomic History    Marital status: Single     Spouse name: Not on file    Number of children: Not on file    Years of education: Not on file    Highest education level: Not on file   Occupational History    Not on file   Social Needs    Financial resource strain: Not on file    Food insecurity:     Worry: Not on file     Inability: Not on file    Transportation needs:     Medical: Not on file     Non-medical: Not on file   Tobacco Use    Smoking status: Former Smoker     Types: Cigarettes     Last attempt to quit: 1985     Years since quittin 6    Smokeless tobacco: Never Used   Substance and Sexual Activity    Alcohol use:  Yes     Alcohol/week: 1 0 standard drinks     Types: 1 Glasses of wine per week     Frequency: 2-4 times a month     Drinks per session: 1 or 2    Drug use: Not on file    Sexual activity: Not on file   Lifestyle    Physical activity:     Days per week: Not on file     Minutes per session: Not on file    Stress: Not on file   Relationships    Social connections:     Talks on phone: Not on file     Gets together: Not on file     Attends Hoahaoism service: Not on file     Active member of club or organization: Not on file     Attends meetings of clubs or organizations: Not on file     Relationship status: Not on file    Intimate partner violence:     Fear of current or ex partner: Not on file     Emotionally abused: Not on file     Physically abused: Not on file     Forced sexual activity: Not on file   Other Topics Concern    Not on file   Social History Narrative    Not on file     Medications have been verified  Objective   /70   Pulse 68   Temp (!) 97 1 °F (36 2 °C) (Tympanic)   Resp (!) 24   Ht 5' 3" (1 6 m)   Wt 65 3 kg (144 lb)   SpO2 97%   BMI 25 51 kg/m²      Physical Exam   Physical Exam   Constitutional: She appears well-developed and well-nourished  No distress  HENT:   Head: Normocephalic and atraumatic  Right Ear: Tympanic membrane normal    Left Ear: Tympanic membrane normal    Nose: Nose normal  No mucosal edema or rhinorrhea  Right sinus exhibits no maxillary sinus tenderness and no frontal sinus tenderness  Left sinus exhibits no maxillary sinus tenderness and no frontal sinus tenderness  Mouth/Throat: Oropharynx is clear and moist  No oropharyngeal exudate, posterior oropharyngeal edema or posterior oropharyngeal erythema  Eyes: Conjunctivae are normal    Cardiovascular: Normal rate, regular rhythm and normal heart sounds  No murmur heard  Pulmonary/Chest: Effort normal and breath sounds normal  No respiratory distress  Abdominal: Normal appearance and bowel sounds are normal  There is no tenderness  There is no CVA tenderness  Nursing note and vitals reviewed

## 2019-08-27 NOTE — PATIENT INSTRUCTIONS
Recommend to continue hydration, increase diet as tolerated  If not improving over the next 2-3 days, follow up with PCP

## 2019-09-04 PROBLEM — B34.9 VIRAL SYNDROME: Status: ACTIVE | Noted: 2019-01-01

## 2019-09-04 NOTE — PROGRESS NOTES
Assessment/Plan:    Viral syndrome  Patient appears to have a viral syndrome  Urinalysis was obtained in the office today  Dipstick was done  There was no evidence of infection but there was glucosuria noted  It is possible she may have developed severe hyperglycemia from her diabetes and this may be causing her symptoms  I am going to recommend we get some labs immediately  I ordered a CBC with diff, CMP, amylase and lipase  She will also have a chest x-ray done to rule out pneumonia  I will make further recommendations as schedule a follow-up visit pending results  Today, I did not prescribe any antibiotics  I recommended she push fluids, follow a light diet, and call if any new symptoms develop  Type 2 diabetes mellitus without complication, without long-term current use of insulin (HCC)  No results found for: HGBA1C    No results for input(s): POCGLU in the last 72 hours  Blood Sugar Average: Last 72 hrs: At the time of her last office visit, I had recommended we start medication for her diabetes but she declined  I am concerned that perhaps her diabetes is uncontrolled now that may be the cause of her symptoms  Await results of labs  Diagnoses and all orders for this visit:    Type 2 diabetes mellitus without complication, without long-term current use of insulin (HCC)  -     UA (URINE) with reflex to Microscopic  -     Microalbumin / creatinine urine ratio    Generalized abdominal pain  -     CBC and differential; Future  -     Comprehensive metabolic panel; Future  -     Amylase; Future  -     Lipase; Future    Cough  -     XR chest pa & lateral; Future          Subjective:      Patient ID: Gracy Plummer is a 80 y o  female  The patient is a 27-year-old white female presents to the office today with her niece  The patient states that 10 days ago, she had 1 episode of loose bowels, which she called diarrhea    She was not feeling well in general so she went to the urgent care center  They told her she had a virus which would have to resolve on its own  Patient states that she is still feeling poorly  She tells me she has no appetite  She has no energy  She does not have any diarrhea  Her bowels are normal   She only had the 1 episode of loose stool  She denies any abdominal pain but admits to some pressure in the lower abdomen  She occasionally has dysuria  She admits to frequency and urgency  She also complains of a dry cough  She denies sputum production  She denies wheezing or shortness of breath  She denies fever or chills  She tells me she has been eating  Last night for supper, she ate crab cakes but feels she had force herself to eat them  She ate normally today  She has been trying to push fluids  The following portions of the patient's history were reviewed and updated as appropriate: allergies, current medications, past family history, past medical history, past social history, past surgical history and problem list     Review of Systems   Constitutional: Positive for appetite change and fatigue  Negative for chills, diaphoresis, fever and unexpected weight change  Reports feeling poorly overall   Cardiovascular: Negative for chest pain, palpitations and leg swelling  Gastrointestinal: Negative for abdominal distention, abdominal pain, blood in stool, constipation, diarrhea, nausea and vomiting  Endocrine: Negative for polydipsia, polyphagia and polyuria  Genitourinary: Positive for frequency and urgency  Negative for difficulty urinating and enuresis  Neurological: Positive for weakness  Objective:      /72 (BP Location: Left arm, Patient Position: Sitting, Cuff Size: Adult)   Pulse 65   Temp 98 9 °F (37 2 °C)   Ht 5' 3" (1 6 m)   Wt 65 1 kg (143 lb 9 6 oz)   SpO2 98%   BMI 25 44 kg/m²          Physical Exam   Constitutional:   The patient is a 79-year-old white female who appears her stated age    She is nonseptic in appearance and in no distress  HENT:   Head: Normocephalic  Right Ear: External ear normal    Left Ear: External ear normal    Mouth/Throat: Oropharynx is clear and moist  No oropharyngeal exudate  She wears hearing aids  Tympanic membranes were clear  Eyes: Pupils are equal, round, and reactive to light  Conjunctivae are normal  No scleral icterus  Neck: Neck supple  No tracheal deviation present  No thyromegaly present  Cardiovascular: Normal rate, regular rhythm and normal heart sounds  Exam reveals no gallop and no friction rub  No murmur heard  Pulmonary/Chest: Effort normal and breath sounds normal  No stridor  No respiratory distress  She has no wheezes  She has no rales  Abdominal: Soft  Bowel sounds are normal  She exhibits no distension and no mass  There is no tenderness  There is no rebound and no guarding  Musculoskeletal:   No CVA tenderness   Lymphadenopathy:     She has no cervical adenopathy  Skin: Skin is warm and dry  No rash noted  She is not diaphoretic  There is pallor  Vitals reviewed  extremities:  Without cyanosis, clubbing, or edema

## 2019-09-05 NOTE — ASSESSMENT & PLAN NOTE
No results found for: HGBA1C    No results for input(s): POCGLU in the last 72 hours  Blood Sugar Average: Last 72 hrs: At the time of her last office visit, I had recommended we start medication for her diabetes but she declined  I am concerned that perhaps her diabetes is uncontrolled now that may be the cause of her symptoms  Await results of labs

## 2019-09-05 NOTE — ASSESSMENT & PLAN NOTE
Patient appears to have a viral syndrome  Urinalysis was obtained in the office today  Dipstick was done  There was no evidence of infection but there was glucosuria noted  It is possible she may have developed severe hyperglycemia from her diabetes and this may be causing her symptoms  I am going to recommend we get some labs immediately  I ordered a CBC with diff, CMP, amylase and lipase  She will also have a chest x-ray done to rule out pneumonia  I will make further recommendations as schedule a follow-up visit pending results  Today, I did not prescribe any antibiotics  I recommended she push fluids, follow a light diet, and call if any new symptoms develop

## 2019-09-16 NOTE — ASSESSMENT & PLAN NOTE
Patient's physical exam is unremarkable  At this point, I still have no explanation for the patient's symptoms other than attributing them to a virus  I believe it will run its course  The patient has her 3 month appointment to see me in about 4 weeks  I am going to have her keep that appointment  I will re-evaluate her at that time  If anything changes the interim, she should call me

## 2019-09-16 NOTE — ASSESSMENT & PLAN NOTE
Patient's labs did show hypokalemia  She denies nausea, vomiting, and diarrhea  She is currently taking potassium supplements  I believe she is also taking a magnesium supplement that she purchase over-the-counter  She does not like it  I will order a BMP and magnesium level

## 2019-09-16 NOTE — PATIENT INSTRUCTIONS
Viral Syndrome, Ambulatory Care   GENERAL INFORMATION:   Viral syndrome  is a term healthcare providers use for general symptoms of a viral infection that has no clear cause  Common symptoms include the following:   · Fever and chills, or a rash    · Runny or stuffy nose     · Cough, sore throat, or hoarseness     · Headache, or pain and pressure around your eyes     · Muscle aches and joint pain     · Shortness of breath or wheezing     · Abdominal pain, cramps, and diarrhea     · Nausea, vomiting, or loss of appetite  Seek immediate care for the following symptoms:   · Continued vomiting and diarrhea    · Chest pain or trouble breathing  Treatment for a viral syndrome  may include medicines to decrease fever, cough, or stuffy nose  You may also need medicines to relieve a rash, itching, or help treat the viral infection  Manage your symptoms:  Drink liquids as directed to help prevent dehydration  Ask how much liquid to drink each day and which liquids are best for you  You may need to drink an oral rehydration solution (ORS)  An ORS has the right amounts of water, salts, and sugar you need to replace body fluids  Prevent the spread of viral syndrome:   · Wash your hands often  Use soap and water  Wash your hands after you use the bathroom, change a child's diapers, or sneeze  Wash your hands before you prepare or eat food  Use a gel hand  if soap and water are not available  · Wear a mask  to help prevent spreading the virus to others  · Cook and handle food properly  Cook food completely through  Clean food preparation surfaces with a disinfectant  · Ask about vaccinations  You may need an influenza (flu) vaccine, pneumococcal vaccine, or meningococcal vaccine  These vaccines help prevent the flu, pneumonia, and meningococcal disease  Follow up with your healthcare provider as directed:  Write down your questions so you remember to ask them during your visits     CARE AGREEMENT:   You have the right to help plan your care  Learn about your health condition and how it may be treated  Discuss treatment options with your caregivers to decide what care you want to receive  You always have the right to refuse treatment  The above information is an  only  It is not intended as medical advice for individual conditions or treatments  Talk to your doctor, nurse or pharmacist before following any medical regimen to see if it is safe and effective for you  © 2014 [address] is for End User's use only and may not be sold, redistributed or otherwise used for commercial purposes  All illustrations and images included in CareNotes® are the copyrighted property of [employer]  or Óscar Nolen

## 2019-09-16 NOTE — PROGRESS NOTES
Assessment/Plan:    Viral syndrome  Patient's physical exam is unremarkable  At this point, I still have no explanation for the patient's symptoms other than attributing them to a virus  I believe it will run its course  The patient has her 3 month appointment to see me in about 4 weeks  I am going to have her keep that appointment  I will re-evaluate her at that time  If anything changes the interim, she should call me  Hypokalemia  Patient's labs did show hypokalemia  She denies nausea, vomiting, and diarrhea  She is currently taking potassium supplements  I believe she is also taking a magnesium supplement that she purchase over-the-counter  She does not like it  I will order a BMP and magnesium level  Diagnoses and all orders for this visit:    Hypokalemia  -     Basic metabolic panel; Future    Hypomagnesemia  -     Magnesium; Future    Viral syndrome          Subjective:      Patient ID: Chemo Chowdary is a 80 y o  female  This patient is a 27-year-old white female presents to the office today for her follow-up visit  She tells me she still not feeling better  She still has a cough although it is improving  She denies any abdominal pain  She tells me she has no appetite and she has to force herself to eat  The patient tells me last night, for supper, she had turkey meat loaf  Today, she had a slice of banana bread for breakfast   She denies any fever or chills  She denies any diarrhea  She denies any blood in her stools  Her niece brought her to the office today  The following portions of the patient's history were reviewed and updated as appropriate: allergies, current medications, past family history, past medical history, past social history, past surgical history and problem list     Review of Systems   Constitutional: Positive for appetite change  Negative for activity change, fatigue, fever and unexpected weight change  Respiratory: Positive for cough   Negative for shortness of breath and wheezing  Denies sputum production   Cardiovascular: Negative for chest pain, palpitations and leg swelling  Gastrointestinal: Negative for abdominal distention, abdominal pain, blood in stool, constipation, diarrhea, nausea and vomiting  Genitourinary: Negative for difficulty urinating, dysuria, flank pain, frequency and urgency  Objective:      /72 (BP Location: Left arm, Patient Position: Sitting, Cuff Size: Adult)   Pulse 65   Ht 5' 3" (1 6 m)   Wt 64 1 kg (141 lb 6 4 oz)   SpO2 99%   BMI 25 05 kg/m²          Physical Exam   Constitutional:   This patient is a 27-year-old white female who appears her stated age  She was nonseptic looking  She was in no distress  HENT:   Head: Normocephalic and atraumatic  Right Ear: External ear normal    Left Ear: External ear normal    Mouth/Throat: Oropharynx is clear and moist  No oropharyngeal exudate  Eyes: Pupils are equal, round, and reactive to light  Conjunctivae are normal  No scleral icterus  Neck: Neck supple  No tracheal deviation present  No thyromegaly present  Cardiovascular: Normal rate, regular rhythm and normal heart sounds  Exam reveals no gallop and no friction rub  No murmur heard  Pulmonary/Chest: Effort normal and breath sounds normal  No stridor  No respiratory distress  She has no wheezes  She has no rales  Abdominal: Soft  Bowel sounds are normal  She exhibits no distension and no mass  There is no tenderness  There is no rebound and no guarding  Lymphadenopathy:     She has no cervical adenopathy  Psychiatric: She has a normal mood and affect  Her behavior is normal  Judgment and thought content normal    Vitals reviewed  Extremities:  Without cyanosis, clubbing, or edema

## 2019-09-17 NOTE — TELEPHONE ENCOUNTER
Pt daughter called for results of blood work  Daughter is agreeable for the metformin medicine for her mother  Daughter also understood to stop taking the magnesium and potassium as well

## 2019-09-18 NOTE — TELEPHONE ENCOUNTER
Called pt daughter to let her know the new medication was sent to the pharmacy  Left message at the home number

## 2019-10-10 PROBLEM — I63.512 CEREBROVASCULAR ACCIDENT (CVA) DUE TO OCCLUSION OF LEFT MIDDLE CEREBRAL ARTERY (HCC): Status: ACTIVE | Noted: 2019-01-01

## 2019-10-10 PROBLEM — H40.89 OTHER SPECIFIED GLAUCOMA: Chronic | Status: ACTIVE | Noted: 2019-01-01

## 2019-10-10 PROBLEM — I63.9 STROKE (CEREBRUM) (HCC): Status: ACTIVE | Noted: 2019-01-01

## 2019-10-10 NOTE — ASSESSMENT & PLAN NOTE
· CT shows hypodense area left frontal and insular region = acute infarct in superior division of left MCA  · Neurology already consulted in emergency department  · MRI  · Echocardiogram  · Neuro checks  · Education  · I&O  · Consult acute rehab and speech and PT OT and nutrition  · Dysphagia assessment  · Check lipid profile, hemoglobin A1c, TSH  · Monitor on telemetry  · Aspirin per rectum  · NIH score = 13

## 2019-10-10 NOTE — ASSESSMENT & PLAN NOTE
Lab Results   Component Value Date    HGBA1C 7 6 (H) 09/16/2019       Recent Labs     10/10/19  1314 10/10/19  1620   POCGLU 144* 141*       Blood Sugar Average: Last 72 hrs:  (P) 142 5   · Patient is NPO  · Accu-Cheks q 6 hours with sliding scale coverage

## 2019-10-10 NOTE — ED NOTES
Took over care of patient @ 1430, Dr Enedina Kasper said "no strait cath specimen required at this time   "     Anita Arana, RN  10/10/19 5242

## 2019-10-10 NOTE — QUICK NOTE
Assessment/plan:  Zeyad Keita  Is a 80-year-old woman with known vascular risk factors who presents with a moderate to large left MCA stroke which appears embolic in nature  She is not a candidate for acute reperfusion therapy  Plan:  Admit to [address]   - suggest fluid resuscitation as her blood pressure is relatively low for someone having an acute stroke  - would suggest 24 hours of permissive hypertension to maximum blood pressure 180/105 ( note deviation from standard permissive hypertension ceiling of 220/110 as result of the patient's age and the size of her stroke which will increase her bleeding risk )  - aspirin 300 mg p r  x1 now, and then 300 mg p r  Daily verses 81 mg by mouth if the patient is able to swallow  - NPO pending speech therapy evaluation  - PT/ OT /SP  -would suggest that carotid Doppler ultrasound will be sufficient to determine if the left internal carotid artery is open  -would suggest 2D echocardiogram  - stat CT head for any significant change in condition  - suggest that MRI brain is quite reasonable and noninvasive but is unlikely to change out, at this point in time  Would not be opposed to repeating her head CT in 24-36 hours to determine if there are any significant changes verses getting an MRI as per typical protocol  -would suggest a palliative care consultation in order to talk with the family and further clarify goals of care          Marya Dahl Is a 80 Year old woman with vascular risk factors including hypertension, hyperlipidemia, and diabetes  She was last known to be well on Tuesday October 8, 2019  She lives alone  She was found down at home today and brought to the hospital by ambulance  At the time of her initial evaluation in the emergency room she is described as nearly mute/ significantly aphasic    Her initial head CT does show a clear left frontal /temporal hypodense region with a degree of cerebral edema and brain compression Consistent with a moderate to large acute ischemic stroke in the superior division of the left MCA  This is most suggestive of an embolic etiology  She is not a candidate for IV thrombolysis as her last known well as outside of the current time window and she has a large stroke already apparent on the CT  Because the stroke is clearly apparent on the noncontrast CT she would also not be considered a candidate for thrombectomy, and her family has already made known that she is DNR/ DNI and they are not interested in aggressive measures per se        Past Medical History:   Diagnosis Date    Diabetes mellitus ([facility] [address] )     non insulin    Disease of thyroid gland     Glaucoma     Hyperlipidemia     Hypertension     Lumbosacral radiculopathy     last assessed: 11/7/2013    Stroke ([facility] [address] )        Scheduled Meds:  Current Facility-Administered Medications:  sodium chloride 125 mL/hr Intravenous Continuous Shelbie Sinclair MD Last Rate: 125 mL/hr (10/10/19 1504)     Continuous Infusions:  sodium chloride 125 mL/hr Last Rate: 125 mL/hr (10/10/19 1504)     PRN Meds:         /68   Pulse 88   Temp (!) 97 4 °F (36 3 °C) (Temporal)   Resp 22   Ht 5' 3" (1 6 m) Comment: Stated  Wt 63 6 kg (140 lb 3 4 oz)   SpO2 92%   BMI 24 84 kg/m²

## 2019-10-10 NOTE — H&P
H&P- Fern Hahn 10/10/1928, 80 y o  female MRN: [id number]    Unit/Bed#: -01 Encounter: [id number]    Primary Care Provider: Senait Estrada DO   Date and time admitted to hospital: 10/10/2019 12:30 PM        * Cerebrovascular accident (CVA) due to occlusion of left middle cerebral artery ([address] )  Assessment & Plan  · CT shows hypodense area left frontal and insular region = acute infarct in superior division of left MCA  · Neurology already consulted in emergency department  · MRI  · Echocardiogram  · Neuro checks  · Education  · I&O  · Consult acute rehab and speech and PT OT and nutrition  · Dysphagia assessment  · Check lipid profile, hemoglobin A1c, TSH  · Monitor on telemetry  · Aspirin per rectum  · NIH score = 13    Essential hypertension, benign  Assessment & Plan  · Will monitor blood pressure as patient has oral antihypertensives    Type 2 diabetes mellitus without complication, without long-term current use of insulin (HCC)  Assessment & Plan  Lab Results   Component Value Date    HGBA1C 7 6 (H) 09/16/2019       Recent Labs     10/10/19  1314 10/10/19  1620   POCGLU 144* 141*       Blood Sugar Average: Last 72 hrs:  (P) 142 5   · Patient is NPO  · Accu-Cheks q 6 hours with sliding scale coverage    Mixed hyperlipidemia  Assessment & Plan  · Continue statin when patient ability to swallow    Hashimoto's thyroiditis  Assessment & Plan  · Check TSH  · Continue Synthroid    Other specified glaucoma  Assessment & Plan  · Continue with eyedrops    VTE Prophylaxis: Heparin  Code Status:  DNR DNI  POLST: POLST is not applicable to this patient  Discussion with family:  Discussed with patient and her niece Denise Polanco at the bedside    Anticipated Length of Stay:  Patient will be admitted on an Inpatient basis with an anticipated length of stay of  > 2 midnights     Justification for Hospital Stay:  Workup for stroke    Total Time for Visit, including Counseling / Coordination of Care: 70   Greater than 50% of this total time spent on direct patient counseling and coordination of care  Chief Complaint:   Mental status change and stroke    History of Present Illness:    Ella Duckworth is a 80 y o  female who presents with mental status change in stroke  This patient is a resident at the [facility], she was brought in by ambulance today for minute status change  She was last known well on Tuesday, 10/08/2019  EMS was called and were able to gain access into her home, finding her in bed with the inability to verbally speak  She was awake and could open her eyes  In the emergency department she was given aspirin per rectum, and had consultation with tele neurology  Her niece Maria Luisa Arcos was contacted, and verified that the patient is a DNR DNI  Due to the time of her last well known time no tPA was administered  Patient did undergo a CT of the head that did show hypodense area in the left frontal and insular region, acute infarct in superior division of left MCA  She will have an MRI and echocardiogram      Patient's NIH stroke scale = 13  Patient does have dysarthria, however, when her niece asked her you know your in the hospital right?", the patient stated of course   However, the next question of tell me what year name is, the patient was only able to mumble and not produce any understandable words  She does answer yes and no questions appropriately        Review of Systems:  Review of Systems   Unable to perform ROS: Acuity of condition (Unable to obtain secondary to patient condition )       Past Medical and Surgical History:   Past Medical History:   Diagnosis Date    Diabetes mellitus ([facility] [address] )     non insulin    Disease of thyroid gland     Glaucoma     Hyperlipidemia     Hypertension     Lumbosacral radiculopathy     last assessed: 11/7/2013    Stroke [facility])        Past Surgical History:   Procedure Laterality Date    APPENDECTOMY      HYSTERECTOMY         Meds/Allergies:  Prior to Admission medications    Medication Sig Start Date End Date Taking? Authorizing Provider   levothyroxine 75 mcg tablet Take 1 tablet (75 mcg total) by mouth daily in the early morning 19  Yes Alesha Baires, DO   metFORMIN (GLUCOPHAGE) 500 mg tablet Take 1 tablet (500 mg total) by mouth 2 (two) times a day with meals 19  Yes John Henry DO   olmesartan (BENICAR) 40 mg tablet Take 1 tablet (40 mg total) by mouth daily 19  Yes John Henry DO   pravastatin (PRAVACHOL) 80 mg tablet TAKE ONE TABLET AT BEDTIME 19  Yes John Henry DO   ALPHAGAN P 0 1 % Administer 1 drop to both eyes 4 (four) times a day  3/3/19   Historical Provider, MD   LUMIGAN 0 01 % ophthalmic drops Administer 1 drop to both eyes daily at bedtime  3/4/19   Historical Provider, MD   RESTASIS 0 05 % ophthalmic emulsion Administer 1 drop to both eyes once  3/3/19   Historical Provider, MD   RHOPRESSA 0 02 % SOLN Administer 1 drop to both eyes once  3/18/19   Historical Provider, MD   potassium chloride (K-DUR,KLOR-CON) 10 mEq tablet Take 10 mEq by mouth daily  2/27/19 10/10/19  Historical Provider, MD     I have reviewed home medications with patient family member  Allergies: No Known Allergies    Social History:  Marital Status: Single   Occupation:  Retired  Patient Pre-hospital Living Situation:  At home alone  Patient Pre-hospital Level of Mobility:  Full functioning, patient recently gave up driving approximately 1 month ago    Patient Pre-hospital Diet Restrictions:  Regular  Substance Use History:     Social History     Substance and Sexual Activity   Alcohol Use Yes    Alcohol/week: 1 0 standard drinks    Types: 1 Glasses of wine per week    Frequency: 2-4 times a month    Drinks per session: 1 or 2    Binge frequency: Never     Social History     Tobacco Use   Smoking Status Former Smoker    Types: Cigarettes    Last attempt to quit: 1985    Years since quittin 7   Smokeless Tobacco Never Used     Social History Substance and Sexual Activity   Drug Use Never       Family History:  I have reviewed the patients family history    Physical Exam:   Vitals:   Blood Pressure: 142/56 (10/10/19 1700)  Pulse: 78 (10/10/19 1700)  Temperature: 97 9 °F (36 6 °C) (10/10/19 1700)  Temp Source: Temporal (10/10/19 1700)  Respirations: 19 (10/10/19 1700)  Height: 5' 3" (160 cm)(Stated) (10/10/19 1539)  Weight - Scale: 63 6 kg (140 lb 3 4 oz) (10/10/19 1539)  SpO2: 96 % (10/10/19 1700)    Physical Exam   Constitutional: Vital signs are normal  She appears well-developed and well-nourished  She is cooperative  HENT:   Head: Normocephalic and atraumatic  Nose: Nose normal    Mouth/Throat: Oropharynx is clear and moist and mucous membranes are normal    Eyes: Conjunctivae and EOM are normal    Neck: Full passive range of motion without pain  Cardiovascular: Normal rate, regular rhythm, normal heart sounds and normal pulses  Pulmonary/Chest: Effort normal and breath sounds normal    Abdominal: Soft  Normal appearance and bowel sounds are normal    Musculoskeletal:   Right upper extremity strength 2/5  Left upper extremity strength 4/5  Bilateral lower extremity strength 4/5   Neurological: She is alert  Patient with dysarthria, at times able to speak coherent words  Patient does answer yes and no by shaking her head at appropriate questions  Skin: Skin is warm  Psychiatric: Her behavior is normal        Additional Data:   Lab Results: I have personally reviewed pertinent reports        Results from last 7 days   Lab Units 10/10/19  1318   WBC Thousand/uL 14 40*   HEMOGLOBIN g/dL 12 1   HEMATOCRIT % 36 7*   PLATELETS Thousands/uL 340   NEUTROS PCT % 83*   LYMPHS PCT % 9*   MONOS PCT % 7   EOS PCT % 1     Results from last 7 days   Lab Units 10/10/19  1318   POTASSIUM mmol/L 4 3   CHLORIDE mmol/L 102   CO2 mmol/L 24   BUN mg/dL 25   CREATININE mg/dL 0 82   CALCIUM mg/dL 9 2   ALK PHOS U/L 78   ALT U/L 17   AST U/L 19     Results from last 7 days   Lab Units 10/10/19  1347   INR  1 36     Results from last 7 days   Lab Units 10/10/19  1620 10/10/19  1314   POC GLUCOSE mg/dl 141* 144*           Imaging: I have personally reviewed pertinent reports  XR chest 1 view   ED Interpretation by Aniket Bruec MD (10/10 9885)   No acute disease      Final Result by Osman Gottlieb MD (10/10 4754)      No acute cardiopulmonary disease  Workstation performed: [id number]         CT head without contrast   Final Result by Philip Hitchcock MD (10/10 8781)      There are hypodense area in the left frontal and insular region representing 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   There is no mass effect and there is no acute hemorrhage             I personally discussed this study with PEGGY CHINO on 10/10/2019 at 2:32 PM                      Workstation performed: [id number]         MRI brain wo contrast    (Results Pending)       EKG, Pathology, and Other Studies Reviewed on Admission:   · EKG:  Normal sinus rhythm, heart rate 74  NetAccess/Epic Records Reviewed: Yes     ** Please Note: This note has been constructed using a voice recognition system   **

## 2019-10-10 NOTE — ED PROVIDER NOTES
History  Chief Complaint   Patient presents with    Altered Mental Status     Blaise Rivera is a 80-year-old female who was brought to the emergency department by ambulance crew due to altered mental status  The last time she was found on her usual state of mind was 3 days prior to arrival   Patient was found laying in bed by the ambulance crew  On arrival in the emergency department patient was nonverbal but opens her eyes to verbal stimulus  History provided by:  EMS personnel   used: No    Altered Mental Status   Presenting symptoms: partial responsiveness    Severity:  Unable to specify  Most recent episode:  More than 2 days ago  Episode history:  Unable to specify  Duration:  3 days  Timing:  Constant  Progression:  Unchanged  Chronicity: Unable to specify  Context: dementia    Context: not alcohol use, not drug use, not head injury, not homeless, taking medications as prescribed, not nursing home resident, not recent change in medication, not recent illness and not recent infection    Associated symptoms: no abdominal pain, normal movement, no agitation, no bladder incontinence, no decreased appetite, no depression, no difficulty breathing, no eye deviation, no fever, no hallucinations, no headaches, no light-headedness, no nausea, no palpitations, no rash, no seizures, no slurred speech, no suicidal behavior, no visual change, no vomiting and no weakness        Prior to Admission Medications   Prescriptions Last Dose Informant Patient Reported? Taking?    ALPHAGAN P 0 1 %  Self Yes No   Sig: Administer 1 drop to both eyes 4 (four) times a day    LUMIGAN 0 01 % ophthalmic drops  Self Yes No   Sig: Administer 1 drop to both eyes daily at bedtime    RESTASIS 0 05 % ophthalmic emulsion  Self Yes No   Sig: Administer 1 drop to both eyes once    RHOPRESSA 0 02 % SOLN  Self Yes No   Sig: Administer 1 drop to both eyes once    levothyroxine 75 mcg tablet  Self No No   Sig: Take 1 tablet (75 mcg total) by mouth daily in the early morning   metFORMIN (GLUCOPHAGE) 500 mg tablet   No No   Sig: Take 1 tablet (500 mg total) by mouth 2 (two) times a day with meals   olmesartan (BENICAR) 40 mg tablet   No No   Sig: Take 1 tablet (40 mg total) by mouth daily   potassium chloride (K-DUR,KLOR-CON) 10 mEq tablet  Self Yes No   Sig: Take 10 mEq by mouth daily    pravastatin (PRAVACHOL) 80 mg tablet   No No   Sig: TAKE ONE TABLET AT BEDTIME      Facility-Administered Medications: None       Past Medical History:   Diagnosis Date    Diabetes mellitus ([facility] [address] )     non insulin    Disease of thyroid gland     Glaucoma     Hyperlipidemia     Hypertension     Lumbosacral radiculopathy     last assessed: 2013       Past Surgical History:   Procedure Laterality Date    APPENDECTOMY      HYSTERECTOMY         Family History   Problem Relation Age of Onset    Colon cancer Mother     No Known Problems Father      I have reviewed and agree with the history as documented  Social History     Tobacco Use    Smoking status: Former Smoker     Types: Cigarettes     Last attempt to quit: 1985     Years since quittin 7    Smokeless tobacco: Never Used   Substance Use Topics    Alcohol use: Yes     Alcohol/week: 1 0 standard drinks     Types: 1 Glasses of wine per week     Frequency: 2-4 times a month     Drinks per session: 1 or 2    Drug use: Never        Review of Systems   Constitutional: Negative for decreased appetite and fever  HENT: Negative  Eyes: Negative  Respiratory: Negative  Cardiovascular: Negative for palpitations  Gastrointestinal: Negative for abdominal pain, nausea and vomiting  Endocrine: Negative  Genitourinary: Negative  Negative for bladder incontinence  Musculoskeletal: Negative  Skin: Negative for rash  Allergic/Immunologic: Negative  Neurological: Negative for seizures, weakness, light-headedness and headaches     Psychiatric/Behavioral: Negative for agitation and hallucinations  Physical Exam  Physical Exam   Constitutional: She appears cachectic  She has a sickly appearance  She appears ill  No distress  HENT:   Head: Normocephalic and atraumatic  Eyes: Pupils are equal, round, and reactive to light  EOM are normal  Right eye exhibits normal extraocular motion  Left eye exhibits normal extraocular motion  Neck: Normal range of motion  Neck supple  No JVD present  No tracheal deviation present  Cardiovascular: Normal rate and normal heart sounds  Pulmonary/Chest: Effort normal and breath sounds normal  No respiratory distress  Abdominal: Soft  Bowel sounds are normal  She exhibits no distension  There is no tenderness  Musculoskeletal: Normal range of motion  She exhibits no edema or tenderness  Neurological: She is unresponsive  Skin: Skin is warm and dry  No rash noted  She is not diaphoretic  No cyanosis or erythema  Psychiatric:   Not evaluated due to patient's condition   Nursing note and vitals reviewed        Vital Signs  ED Triage Vitals [10/10/19 1241]   Temperature Pulse Respirations Blood Pressure SpO2   97 7 °F (36 5 °C) 76 16 103/51 97 %      Temp Source Heart Rate Source Patient Position - Orthostatic VS BP Location FiO2 (%)   Temporal Monitor Lying Left arm --      Pain Score       --           Vitals:    10/10/19 1241 10/10/19 1330 10/10/19 1500   BP: 103/51  136/65   Pulse: 76 69 78   Patient Position - Orthostatic VS: Lying           Visual Acuity  Visual Acuity      Most Recent Value   L Pupil Size (mm)  2   R Pupil Size (mm)  3          ED Medications  Medications   sodium chloride 0 9 % infusion (125 mL/hr Intravenous New Bag 10/10/19 1504)   sodium chloride 0 9 % bolus 2,000 mL (0 mL Intravenous Stopped 10/10/19 1505)   aspirin rectal suppository 300 mg (300 mg Rectal Given 10/10/19 1505)       Diagnostic Studies  Results Reviewed     Procedure Component Value Units Date/Time    Protime-INR [[id number]]  (Abnormal) Collected:  10/10/19 1347    Lab Status:  Final result Specimen:  Blood from Arm, Left Updated:  10/10/19 1407     Protime 15 8 seconds      INR 1 36    APTT [[id number]]  (Normal) Collected:  10/10/19 1347    Lab Status:  Final result Specimen:  Blood from Arm, Left Updated:  10/10/19 1407     PTT 31 seconds     Comprehensive metabolic panel [[id number]]  (Abnormal) Collected:  10/10/19 1318    Lab Status:  Final result Specimen:  Blood from Arm, Left Updated:  10/10/19 1342     Sodium 135 mmol/L      Potassium 4 3 mmol/L      Chloride 102 mmol/L      CO2 24 mmol/L      ANION GAP 9 mmol/L      BUN 25 mg/dL      Creatinine 0 82 mg/dL      Glucose 164 mg/dL      Calcium 9 2 mg/dL      AST 19 U/L      ALT 17 U/L      Alkaline Phosphatase 78 U/L      Total Protein 7 1 g/dL      Albumin 3 1 g/dL      Total Bilirubin 0 60 mg/dL      eGFR 63 ml/min/1 73sq m     Narrative:       [place] guidelines for Chronic Kidney Disease (CKD):     Stage 1 with normal or high GFR (GFR > 90 mL/min/1 73 square meters)    Stage 2 Mild CKD (GFR = 60-89 mL/min/1 73 square meters)    Stage 3A Moderate CKD (GFR = 45-59 mL/min/1 73 square meters)    Stage 3B Moderate CKD (GFR = 30-44 mL/min/1 73 square meters)    Stage 4 Severe CKD (GFR = 15-29 mL/min/1 73 square meters)    Stage 5 End Stage CKD (GFR <15 mL/min/1 73 square meters)  Note: GFR calculation is accurate only with a steady state creatinine    CBC and differential [[id number]]  (Abnormal) Collected:  10/10/19 1318    Lab Status:  Final result Specimen:  Blood from Arm, Left Updated:  10/10/19 1329     WBC 14 40 Thousand/uL      RBC 4 18 Million/uL      Hemoglobin 12 1 g/dL      Hematocrit 36 7 %      MCV 88 fL      MCH 28 9 pg      MCHC 33 0 g/dL      RDW 14 1 %      MPV 7 3 fL      Platelets 336 Thousands/uL      Neutrophils Relative 83 %      Lymphocytes Relative 9 %      Monocytes Relative 7 %      Eosinophils Relative 1 %      Basophils Relative 0 % Neutrophils Absolute 11 90 Thousands/µL      Lymphocytes Absolute 1 30 Thousands/µL      Monocytes Absolute 1 10 Thousand/µL      Eosinophils Absolute 0 10 Thousand/µL      Basophils Absolute 0 10 Thousands/µL     Lactic acid, plasma x2 [[id number]]  (Normal) Collected:  10/10/19 1251    Lab Status:  Final result Specimen:  Blood from Arm, Left Updated:  10/10/19 1317     LACTIC ACID 1 8 mmol/L     Narrative:       Result may be elevated if tourniquet was used during collection  Fingerstick Glucose (POCT) [[id number]]  (Abnormal) Collected:  10/10/19 1314    Lab Status:  Final result Updated:  10/10/19 1315     POC Glucose 144 mg/dl     Blood culture #1 [[id number]] Collected:  10/10/19 1255    Lab Status: In process Specimen:  Blood from Arm, Right Updated:  10/10/19 1305    Blood culture #2 [[id number]] Collected:  10/10/19 1255    Lab Status: In process Specimen:  Blood from Arm, Right Updated:  10/10/19 1305    Lactic acid, plasma x2 [[id number]]     Lab Status:  No result Specimen:  Blood     UA w Reflex to Microscopic w Reflex to Culture [[id number]]     Lab Status:  No result Specimen:  Urine, Straight Cath                  XR chest 1 view   ED Interpretation by Rick Cortes MD (10/10 2448)   No acute disease      Final Result by Aki Barnes MD (10/10 9369)      No acute cardiopulmonary disease              Workstation performed: [id number]         CT head without contrast   Final Result by Elías Gilbert MD (10/10 1432)      There are hypodense area in the left frontal and insular region representing 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   There is no mass effect and there is no acute hemorrhage             I personally discussed this study with PEGGY CHINO on 10/10/2019 at 2:32 PM                      Workstation performed: [id number]                    Procedures  ECG 12 Lead Documentation Only  Date/Time: 10/10/2019 12:49 PM  Performed by: Rick Cortes MD  Authorized by: Abelardo Charles MD     Indications / Diagnosis:  Altered mental status  ECG reviewed by me, the ED Provider: yes    Patient location:  ED  Previous ECG:     Previous ECG:  Unavailable    Comparison to cardiac monitor: Yes    Interpretation:     Interpretation: abnormal    Quality:     Tracing quality:  Limited by artifact  Rate:     ECG rate:  74 beats per minute    ECG rate assessment: normal    Rhythm:     Rhythm: sinus rhythm    Ectopy:     Ectopy: none    QRS:     QRS axis:  Normal    QRS intervals:  Normal  Conduction:     Conduction: normal    ST segments:     ST segments:  Non-specific  T waves:     T waves: non-specific             ED Course  ED Course as of Oct 10 1514   Thu Oct 10, 2019   1457 Discussed with Dr Key Prakash, who recommended admitting the patient in this facility  56 As discussed with the patient's niece, patient is DNR and DNI  Family does not want aggressive measures at this time  1512 Discussed with hospitalist on call about the plan for admission and she agreed                                    MDM  Number of Diagnoses or Management Options  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     Amount and/or Complexity of Data Reviewed  Clinical lab tests: ordered and reviewed  Tests in the radiology section of CPT®: ordered and reviewed  Tests in the medicine section of CPT®: ordered and reviewed  Discussion of test results with the performing providers: yes  Decide to obtain previous medical records or to obtain history from someone other than the patient: yes  Obtain history from someone other than the patient: yes  Review and summarize past medical records: yes  Discuss the patient with other providers: yes  Independent visualization of images, tracings, or specimens: yes    Risk of Complications, Morbidity, and/or Mortality  Presenting problems: moderate  Diagnostic procedures: moderate  Management options: moderate    Patient Progress  Patient progress: stable      Disposition  Final diagnoses:   Stroke (cerebrum) ([facility] [address] )     Time reflects when diagnosis was documented in both MDM as applicable and the Disposition within this note     Time User Action Codes Description Comment    10/10/2019  3:12 PM [place] Add [I63 9] Stroke (cerebrum) [facility])       ED Disposition     ED Disposition Condition Date/Time Comment    Admit Stable Thu Oct 10, 2019  3:12 PM Case was discussed with Hospitalist on call and the patient's admission status was agreed to be Admission Status: inpatient status to the service of Dr Terrilyn Leyden   Follow-up Information    None         Patient's Medications   Discharge Prescriptions    No medications on file     No discharge procedures on file      ED Provider  Electronically Signed by           Hamida Jones MD  10/10/19 0740

## 2019-10-10 NOTE — NURSING NOTE
Pt admitted into room 104 1, niece at the bedside, samia manjarrez np in to see and assess the pt, oriented to the room and callbell, stroke protocol, bed alarm on

## 2019-10-10 NOTE — PLAN OF CARE
Pt admitted at this time  Problem: Potential for Falls  Goal: Patient will remain free of falls  Description  INTERVENTIONS:  - Assess patient frequently for physical needs  -  Identify cognitive and physical deficits and behaviors that affect risk of falls    -  [place] fall precautions as indicated by assessment   - Educate patient/family on patient safety including physical limitations  - Instruct patient to call for assistance with activity based on assessment  - Modify environment to reduce risk of injury  - Consider OT/PT consult to assist with strengthening/mobility  Outcome: Not Progressing     Problem: Prexisting or High Potential for Compromised Skin Integrity  Goal: Skin integrity is maintained or improved  Description  INTERVENTIONS:  - Identify patients at risk for skin breakdown  - Assess and monitor skin integrity  - Assess and monitor nutrition and hydration status  - Monitor labs   - Assess for incontinence   - Turn and reposition patient  - Assist with mobility/ambulation  - Relieve pressure over bony prominences  - Avoid friction and shearing  - Provide appropriate hygiene as needed including keeping skin clean and dry  - Evaluate need for skin moisturizer/barrier cream  - Collaborate with interdisciplinary team   - Patient/family teaching  - Consider wound care consult   Outcome: Not Progressing     Problem: PAIN - ADULT  Goal: Verbalizes/displays adequate comfort level or baseline comfort level  Description  Interventions:  - Encourage patient to monitor pain and request assistance  - Assess pain using appropriate pain scale  - Administer analgesics based on type and severity of pain and evaluate response  - Implement non-pharmacological measures as appropriate and evaluate response  - Consider cultural and social influences on pain and pain management  - Notify physician/advanced practitioner if interventions unsuccessful or patient reports new pain  Outcome: Not Progressing     Problem: INFECTION - ADULT  Goal: Absence or prevention of progression during hospitalization  Description  INTERVENTIONS:  - Assess and monitor for signs and symptoms of infection  - Monitor lab/diagnostic results  - Monitor all insertion sites, i e  indwelling lines, tubes, and drains  - Monitor endotracheal if appropriate and nasal secretions for changes in amount and color  - [place] appropriate cooling/warming therapies per order  - Administer medications as ordered  - Instruct and encourage patient and family to use good hand hygiene technique  - Identify and instruct in appropriate isolation precautions for identified infection/condition  Outcome: Not Progressing  Goal: Absence of fever/infection during neutropenic period  Description  INTERVENTIONS:  - Monitor WBC    Outcome: Not Progressing     Problem: SAFETY ADULT  Goal: Maintain or return to baseline ADL function  Description  INTERVENTIONS:  -  Assess patient's ability to carry out ADLs; assess patient's baseline for ADL function and identify physical deficits which impact ability to perform ADLs (bathing, care of mouth/teeth, toileting, grooming, dressing, etc )  - Assess/evaluate cause of self-care deficits   - Assess range of motion  - Assess patient's mobility; develop plan if impaired  - Assess patient's need for assistive devices and provide as appropriate  - Encourage maximum independence but intervene and supervise when necessary  - Involve family in performance of ADLs  - Assess for home care needs following discharge   - Consider OT consult to assist with ADL evaluation and planning for discharge  - Provide patient education as appropriate  Outcome: Not Progressing  Goal: Maintain or return mobility status to optimal level  Description  INTERVENTIONS:  - Assess patient's baseline mobility status (ambulation, transfers, stairs, etc )    - Identify cognitive and physical deficits and behaviors that affect mobility  - Identify mobility aids required to assist with transfers and/or ambulation (gait belt, sit-to-stand, lift, walker, cane, etc )  - [place] fall precautions as indicated by assessment  - Record patient progress and toleration of activity level on Mobility SBAR; progress patient to next Phase/Stage  - Instruct patient to call for assistance with activity based on assessment  - Consider rehabilitation consult to assist with strengthening/weightbearing, etc   Outcome: Not Progressing     Problem: DISCHARGE PLANNING  Goal: Discharge to home or other facility with appropriate resources  Description  INTERVENTIONS:  - Identify barriers to discharge w/patient and caregiver  - Arrange for needed discharge resources and transportation as appropriate  - Identify discharge learning needs (meds, wound care, etc )  - Arrange for interpretive services to assist at discharge as needed  - Refer to Case Management Department for coordinating discharge planning if the patient needs post-hospital services based on physician/advanced practitioner order or complex needs related to functional status, cognitive ability, or social support system  Outcome: Not Progressing     Problem: Knowledge Deficit  Goal: Patient/family/caregiver demonstrates understanding of disease process, treatment plan, medications, and discharge instructions  Description  Complete learning assessment and assess knowledge base    Interventions:  - Provide teaching at level of understanding  - Provide teaching via preferred learning methods  Outcome: Not Progressing     Problem: NEUROSENSORY - ADULT  Goal: Achieves stable or improved neurological status  Description  INTERVENTIONS  - Monitor and report changes in neurological status  - Monitor vital signs such as temperature, blood pressure, glucose, and any other labs ordered   - Initiate measures to prevent increased intracranial pressure  - Monitor for seizure activity and implement precautions if appropriate      Outcome: Not Progressing  Goal: Remains free of injury related to seizures activity  Description  INTERVENTIONS  - Maintain airway, patient safety  and administer oxygen as ordered  - Monitor patient for seizure activity, document and report duration and description of seizure to physician/advanced practitioner  - If seizure occurs,  ensure patient safety during seizure  - Reorient patient post seizure  - Seizure pads on all 4 side rails  - Instruct patient/family to notify RN of any seizure activity including if an aura is experienced  - Instruct patient/family to call for assistance with activity based on nursing assessment  - Administer anti-seizure medications if ordered    Outcome: Not Progressing  Goal: Achieves maximal functionality and self care  Description  INTERVENTIONS  - Monitor swallowing and airway patency with patient fatigue and changes in neurological status  - Encourage and assist patient to increase activity and self care     - Encourage visually impaired, hearing impaired and aphasic patients to use assistive/communication devices  Outcome: Not Progressing     Problem: CARDIOVASCULAR - ADULT  Goal: Maintains optimal cardiac output and hemodynamic stability  Description  INTERVENTIONS:  - Monitor I/O, vital signs and rhythm  - Monitor for S/S and trends of decreased cardiac output  - Administer and titrate ordered vasoactive medications to optimize hemodynamic stability  - Assess quality of pulses, skin color and temperature  - Assess for signs of decreased coronary artery perfusion  - Instruct patient to report change in severity of symptoms  Outcome: Not Progressing  Goal: Absence of cardiac dysrhythmias or at baseline rhythm  Description  INTERVENTIONS:  - Continuous cardiac monitoring, vital signs, obtain 12 lead EKG if ordered  - Administer antiarrhythmic and heart rate control medications as ordered  - Monitor electrolytes and administer replacement therapy as ordered  Outcome: Not Progressing     Problem: RESPIRATORY - ADULT  Goal: Achieves optimal ventilation and oxygenation  Description  INTERVENTIONS:  - Assess for changes in respiratory status  - Assess for changes in mentation and behavior  - Position to facilitate oxygenation and minimize respiratory effort  - Oxygen administered by appropriate delivery if ordered  - Initiate smoking cessation education as indicated  - Encourage broncho-pulmonary hygiene including cough, deep breathe, Incentive Spirometry  - Assess the need for suctioning and aspirate as needed  - Assess and instruct to report SOB or any respiratory difficulty  - Respiratory Therapy support as indicated  Outcome: Not Progressing     Problem: SKIN/TISSUE INTEGRITY - ADULT  Goal: Skin integrity remains intact  Description  INTERVENTIONS  - Identify patients at risk for skin breakdown  - Assess and monitor skin integrity  - Assess and monitor nutrition and hydration status  - Monitor labs (i e  albumin)  - Assess for incontinence   - Turn and reposition patient  - Assist with mobility/ambulation  - Relieve pressure over bony prominences  - Avoid friction and shearing  - Provide appropriate hygiene as needed including keeping skin clean and dry  - Evaluate need for skin moisturizer/barrier cream  - Collaborate with interdisciplinary team (i e  Nutrition, Rehabilitation, etc )   - Patient/family teaching  Outcome: Not Progressing  Goal: Incision(s), wounds(s) or drain site(s) healing without S/S of infection  Description  INTERVENTIONS  - Assess and document risk factors for skin impairment   - Assess and document dressing, incision, wound bed, drain sites and surrounding tissue  - Consider nutrition services referral as needed  - Oral mucous membranes remain intact  - Provide patient/ family education  Outcome: Not Progressing  Goal: Oral mucous membranes remain intact  Description  INTERVENTIONS  - Assess oral mucosa and hygiene practices  - Implement preventative oral hygiene regimen  - Implement oral medicated treatments as ordered  - Initiate Nutrition services referral as needed  Outcome: Not Progressing     Problem: Neurological Deficit  Goal: Neurological status is stable or improving  Description  Interventions:  - Monitor and assess patient's level of consciousness, motor function, sensory function, and level of assistance needed for ADLs  - Monitor and report changes from baseline  Collaborate with interdisciplinary team to initiate plan and implement interventions as ordered  - Provide and maintain a safe environment  - Consider seizure precautions  - Consider fall precautions  - Consider aspiration precautions  - Consider bleeding precautions  Outcome: Not Progressing     Problem: Activity Intolerance/Impaired Mobility  Goal: Mobility/activity is maintained at optimum level for patient  Description  Interventions:  - Assess and monitor patient  barriers to mobility and need for assistive/adaptive devices  - Assess patient's emotional response to limitations  - Collaborate with interdisciplinary team and initiate plans and interventions as ordered  - Encourage independent activity per ability   - Maintain proper body alignment  - Perform active/passive rom as tolerated/ordered  - Plan activities to conserve energy   - Turn patient as appropriate  Outcome: Not Progressing     Problem: Communication Impairment  Goal: Ability to express needs and understand communication  Description  Assess patient's communication skills and ability to understand information  Patient will demonstrate use of effective communication techniques, alternative methods of communication and understanding even if not able to speak  - Encourage communication and provide alternate methods of communication as needed  - Collaborate with case management/ for discharge needs  - Include patient/family/caregiver in decisions related to communication    Outcome: Not Progressing     Problem: Potential for Aspiration  Goal: Non-ventilated patient's risk of aspiration is minimized  Description  Assess and monitor vital signs, respiratory status, and labs (WBC)  Monitor for signs of aspiration (tachypnea, cough, rales, wheezing, cyanosis, fever)  - Assess and monitor patient's ability to swallow  - Place patient up in chair to eat if possible  - HOB up at 90 degrees to eat if unable to get patient up into chair   - Supervise patient during oral intake  - Instruct patient/ family to take small bites  - Instruct patient/ family to take small single sips when taking liquids  - Follow patient-specific strategies generated by speech pathologist   Outcome: Not Progressing  Goal: Ventilated patient's risk of aspiration is minimized  Description  Assess and monitor vital signs, respiratory status, airway cuff pressure, and labs (WBC)  Monitor for signs of aspiration (tachypnea, cough, rales, wheezing, cyanosis, fever)  - Elevate head of bed 30 degrees if patient has tube feeding   - Monitor tube feeding  Outcome: Not Progressing     Problem: Nutrition  Goal: Nutrition/Hydration status is improving  Description  Monitor and assess patient's nutrition/hydration status for malnutrition (ex- brittle hair, bruises, dry skin, pale skin and conjunctiva, muscle wasting, smooth red tongue, and disorientation)  Collaborate with interdisciplinary team and initiate plan and interventions as ordered  Monitor patient's weight and dietary intake as ordered or per policy  Utilize nutrition screening tool and intervene per policy  Determine patient's food preferences and provide high-protein, high-caloric foods as appropriate  - Assist patient with eating   - Allow adequate time for meals   - Encourage patient to take dietary supplement as ordered  - Collaborate with clinical nutritionist   - Include patient/family/caregiver in decisions related to nutrition    Outcome: Not Progressing

## 2019-10-10 NOTE — RESPIRATORY THERAPY NOTE
RT Protocol Note  [facility] 80 [facility]  [facility] MRN: [id number]  Unit/Bed#: -01 Encounter: [id number]    Assessment    Principal Problem:    Stroke (cerebrum) ([facility] )  Active Problems:    Essential hypertension, benign    Type 2 diabetes mellitus without complication, without long-term current use of insulin (HCC)    Mixed hyperlipidemia    Hashimoto's thyroiditis      Home Pulmonary Medications:  none  Home Devices/Therapy: Other (Comment)(no home medications)    Past Medical History:   Diagnosis Date    Diabetes mellitus ([facility] )     non insulin    Disease of thyroid gland     Glaucoma     Hyperlipidemia     Hypertension     Lumbosacral radiculopathy     last assessed: 2013    [facility]roke [facility])      Social History     Socioeconomic History    Marital status: Single     Spouse name: None    Number of children: None    Years of education: None    Highest education level: None   Occupational History    None   Social Needs    Financial resource strain: None    Food insecurity:     Worry: None     Inability: None    Transportation needs:     Medical: None     Non-medical: None   Tobacco Use    Smoking status: Former Smoker     Types: Cigarettes     Last attempt to quit: 1985     Years since quittin 7    Smokeless tobacco: Never Used   Substance and Sexual Activity    Alcohol use:  Yes     Alcohol/week: 1 0 standard drinks     Types: 1 Glasses of wine per week     Frequency: 2-4 times a month     Drinks per session: 1 or 2     Binge frequency: Never    Drug use: Never    Sexual activity: None   Lifestyle    Physical activity:     Days per week: None     Minutes per session: None    Stress: None   Relationships    Social connections:     Talks on phone: None     Gets together: None     Attends Bahai service: None     Active member of club or organization: None     Attends meetings of clubs or organizations: None     Relationship status: None    Intimate partner violence:     Fear of current or ex partner: None     Emotionally abused: None     Physically abused: None     Forced sexual activity: None   Other Topics Concern    None   Social History Narrative    None       Subjective         Objective    Physical Exam:   Assessment Type: Assess only  General Appearance: Drowsy  Respiratory Pattern: Normal  Chest Assessment: Chest expansion symmetrical  Bilateral Breath Sounds: Clear    Vitals:  Blood pressure 116/72, pulse 68, temperature (!) 97 1 °F (36 2 °C), temperature source Tympanic, resp  rate 20, height 5' 3" (1 6 m), weight 63 6 kg (140 lb 3 4 oz), SpO2 98 %  Imaging and other studies: I have personally reviewed pertinent reports              Plan    Respiratory Plan: Discontinue Protocol  Airway Clearance Plan: Incentive Spirometer     Resp Comments: Patient on roon air clear breath sounds, no home medicatiions

## 2019-10-11 PROBLEM — Z51.5 END OF LIFE CARE: Status: ACTIVE | Noted: 2019-01-01

## 2019-10-11 PROBLEM — Z51.5 HOSPICE CARE PATIENT: Chronic | Status: ACTIVE | Noted: 2019-01-01

## 2019-10-11 NOTE — H&P
H&P- Fern Hahn 10/10/1928, 80 y o  female MRN: [id number]    Unit/Bed#: -01 Encounter: [id number]    Primary Care Provider: Tuyet Corley DO   Date and time admitted to hospital: 10/11/2019  5:08 PM        * Hospice care patient  Assessment & Plan  · Patient placed on comfort care after discussion with patient's shila Lui  · Will continue with prn medications        VTE Prophylaxis: patient is on hospice  Code Status: hospice care  POLST: hospice patient  Discussion with family:discussed with patient's shila Lui    Anticipated Length of Stay:  Patient will be admitted on an Hospice basis with an anticipated length of stay of  > 2 midnights  Justification for Hospital Stay: general inpatient hospice     Total Time for Visit, including Counseling / Coordination of Care: 45 minutes  Greater than 50% of this total time spent on direct patient counseling and coordination of care  Chief Complaint:   Patient is approved for general inpatient hospice  History of Present Illness:    Irena Sorto is a 80 y o  female who presents with patient is approved for general inpatient hospice  This is secondary to a stroke  Review of Systems:  Review of Systems   Unable to perform ROS: Acuity of condition (hospice care)       Past Medical and Surgical History:   Past Medical History:   Diagnosis Date    Diabetes mellitus ([facility] )     non insulin    Disease of thyroid gland     Glaucoma     Hyperlipidemia     Hypertension     Lumbosacral radiculopathy     last assessed: 11/7/2013    Stroke [facility])        Past Surgical History:   Procedure Laterality Date    APPENDECTOMY      HYSTERECTOMY         Meds/Allergies:  Prior to Admission medications    Medication Sig Start Date End Date Taking?  Authorizing Provider   ALPHAGAN P 0 1 % Administer 1 drop to both eyes 4 (four) times a day  3/3/19   Historical Provider, MD   haloperidol lactate (HALDOL) 5 mg/mL Inject 0 2 mL (1 mg total) into a muscle every hour as needed for agitation 10/11/19   CASSI Velez   haloperidol lactate (HALDOL) 5 mg/mL Infuse 1 mL (5 mg total) into a venous catheter every 6 (six) hours 10/11/19   CASSI Douglas   levothyroxine 75 mcg tablet Take 1 tablet (75 mcg total) by mouth daily in the early morning 7/8/19   John Henry DO   LORazepam (ATIVAN) 2 mg/mL Infuse 1 mL (2 mg total) into a venous catheter every 4 (four) hours for 10 days 10/11/19 10/21/19  [facility] CASSI Rodriguez   LORazepam (ATIVAN) 2 mg/mL Infuse 1 mL (2 mg total) into a venous catheter every hour as needed for anxiety (end of life management) for up to 10 days 10/11/19 10/21/19  [facility] CASSI Rodriguez   LUMIGAN 0 01 % ophthalmic drops Administer 1 drop to both eyes daily at bedtime  3/4/19   Historical Provider, MD   metFORMIN (GLUCOPHAGE) 500 mg tablet Take 1 tablet (500 mg total) by mouth 2 (two) times a day with meals 9/17/19   Karyna Kate DO   Morphine Sulfate (MORPHINE, PF,) 4 mg/mL Infuse 1 mL (4 mg total) into a venous catheter every 4 (four) hours for 10 daysMax Daily Amount: 24 mg 10/11/19 10/21/19  CASSI Velez   Morphine Sulfate (MORPHINE, PF,) 4 mg/mL Infuse 1 mL (4 mg total) into a venous catheter every hour as needed (pain or dyspnea) for up to 10 daysMax Daily Amount: 96 mg 10/11/19 10/21/19  CASSI Velez   olmesartan (BENICAR) 40 mg tablet Take 1 tablet (40 mg total) by mouth daily 9/5/19   Karyna Kate DO   pravastatin (PRAVACHOL) 80 mg tablet TAKE ONE TABLET AT BEDTIME 9/30/19   John Henry DO   RESTASIS 0 05 % ophthalmic emulsion Administer 1 drop to both eyes once  3/3/19   Historical Provider, MD   RHOPRESSA 0 02 % SOLN Administer 1 drop to both eyes once  3/18/19   Historical Provider, MD   scopolamine (TRANSDERM-SCOP) 1 5 mg/3 days TD 72 hr patch Place 1 patch on the skin every third day 10/14/19   [facility] CASSI Plascencia     prn medications via hospice     Allergies: No Known Allergies    Social History:  Marital Status: Single   Occupation: retired  Patient Pre-hospital Living Situation: at home  Patient Pre-hospital Level of Mobility: functioning  Patient Pre-hospital Diet Restrictions: npo  Substance Use History:     Social History     Substance and Sexual Activity   Alcohol Use Yes    Alcohol/week: 1 0 standard drinks    Types: 1 Glasses of wine per week    Frequency: 2-4 times a month    Drinks per session: 1 or 2    Binge frequency: Never     Social History     Tobacco Use   Smoking Status Former Smoker    Types: Cigarettes    Last attempt to quit: 1985    Years since quittin 7   Smokeless Tobacco Never Used     Social History     Substance and Sexual Activity   Drug Use Never       Family History:  I have reviewed the patients family history    Physical Exam:   Vitals:        Physical Exam   Constitutional:   Physical exam deferred by patient's family  Additional Data:   Lab Results: I have personally reviewed pertinent reports  Results from last 7 days   Lab Units 10/11/19  0444   WBC Thousand/uL 11 60*   HEMOGLOBIN g/dL 11 8*   HEMATOCRIT % 36 2*   PLATELETS Thousands/uL 350   NEUTROS PCT % 83*   LYMPHS PCT % 10*   MONOS PCT % 6   EOS PCT % 1     Results from last 7 days   Lab Units 10/11/19  0629   POTASSIUM mmol/L 4 0   CHLORIDE mmol/L 108*   CO2 mmol/L 18*   BUN mg/dL 25   CREATININE mg/dL 0 75   CALCIUM mg/dL 8 7   ALK PHOS U/L 80   ALT U/L 20   AST U/L 24     Results from last 7 days   Lab Units 10/11/19  0629   INR  1 32     Results from last 7 days   Lab Units 10/11/19  1127 10/11/19  0558 10/10/19  2019 10/10/19  1620 10/10/19  1314   POC GLUCOSE mg/dl 154* 179* 165* 141* 144*     Results from last 7 days   Lab Units 10/10/19  1729   HEMOGLOBIN A1C % 8 0*       Imaging: I have personally reviewed pertinent reports      No orders to display       EKG, Pathology, and Other Studies Reviewed on Admission:   · EKG: not applicable    [facility] Yes     ** Please Note: This note has been constructed using a voice recognition system   **

## 2019-10-11 NOTE — QUICK NOTE
I spoke with the patient's niece Salas Mariano, who confirmed comfort care measures only, hospice evaluation, DNR/DNI status

## 2019-10-11 NOTE — PHYSICAL THERAPY NOTE
Physical Therapy Evaluation     Patient's Name: Miguel Dorman    Admitting Diagnosis  Stroke (cerebrum) ([facility] ) [I63 9]  Unresponsive [R41 89]    Problem List  Patient Active Problem List   Diagnosis    Essential hypertension, benign    Type 2 diabetes mellitus without complication, without long-term current use of insulin ([facility] )    Mixed hyperlipidemia    Hashimoto's thyroiditis    Hypokalemia    Viral syndrome    Cerebrovascular accident (CVA) due to occlusion of left middle cerebral artery ([facility] )    Other specified glaucoma       Past Medical History  Past Medical History:   Diagnosis Date    Diabetes mellitus ([facility] )     non insulin    Disease of thyroid gland     Glaucoma     Hyperlipidemia     Hypertension     Lumbosacral radiculopathy     last assessed: 11/7/2013    Stroke [facility])        Past Surgical History  Past Surgical History:   Procedure Laterality Date    APPENDECTOMY      HYSTERECTOMY          10/11/19 0818   Note Type   Note type Eval/Treat   Pain Assessment   Pain Assessment No/denies pain   Pain Score No Pain   Home Living   Type of Home Apartment  (high rise)   Home Layout One level;Performs ADLs on one level; Able to live on main level with bedroom/bathroom; Access; Elevator;Ramped entrance   Bathroom Shower/Tub Tub/shower unit   Bathroom Toilet Standard   [facility]  (Pt  acknowledged prior RW usage w/a nod )   Prior Function   Level of [place] Independent with ADLs and functional mobility   Lives With Alone  (obtained via H&P as pt  had difficulty w/expression )   Receives Help From   (unknown)   ADL Assistance Independent   IADLs Needs assistance  (as per H&P, pt  gave up driving x 1 month ago)   Falls in the last 6 months 1 to 4   Vocational Retired   Restrictions/Precautions   [facility] Bearing Precautions Per Order No   Other Precautions Cognitive; Fall Risk;Telemetry   General   Family/Caregiver Present No   Cognition   Overall Cognitive Status Unable to assess  (2/2 dysarthria)   Arousal/Participation Responsive   Orientation Level Unable to assess   Memory Unable to assess   Following Commands Follows one step commands with increased time or repetition   RUE Assessment   RUE Assessment X  (Please see OT assessment )   LUE Assessment   LUE Assessment   (Please see OT assessment )   RLE Assessment   RLE Assessment X   Strength RLE   R Hip Flexion 2/5   R Hip Extension 2/5   R Knee Flexion 2-/5   R Knee Extension 2-/5   R Ankle Dorsiflexion 2-/5   R Ankle Plantar Flexion 2-/5   LLE Assessment   LLE Assessment X   Strength LLE   L Hip Flexion 3/5   L Hip Extension 3/5   L Knee Flexion 3+/5   L Knee Extension 3+/5   L Ankle Dorsiflexion 3-/5   L Ankle Plantar Flexion 3-/5   Coordination   Movements are Fluid and Coordinated 0   Coordination and Movement Description Incremental mobility requiring increased time & tactile cues of 2 throughout session  Light Touch   RLE Light Touch Impaired   LLE Light Touch Grossly intact   Sharp/Dull   RLE Sharp/Dull Impaired   LLE Sharp/Dull Grossly intact   Bed Mobility   Rolling R 3  Moderate assistance   Additional items Assist x 2;Bedrails; Increased time required;Verbal cues;LE management   Rolling L 3  Moderate assistance   Additional items Assist x 2;Bedrails; Increased time required;Verbal cues;LE management   Supine to Sit 3  Moderate assistance   Additional items Assist x 2;HOB elevated; Bedrails; Increased time required;Verbal cues;LE management   Sit to Supine 3  Moderate assistance   Additional items Assist x 2;Bedrails; Increased time required;Verbal cues;LE management   Additional Comments Observed decreased R sided acknowledgement throughout session,required verbal cues to encourage environmental scanning and utilization  Transfers   Sit to Stand 3  Moderate assistance   Additional items Assist x 2;Bedrails; Increased time required;Verbal cues  (x 3 trials)   Stand to Sit 3  Moderate assistance Additional items Assist x 2;Bedrails; Increased time required;Verbal cues  (x 3 trials)   Stand pivot Unable to assess  (2/2 safety concerns w/inability to translate a step)   Ambulation/Elevation   Gait pattern Not appropriate; Not tested   Balance   Static Sitting Fair   Dynamic Sitting Fair -   Static Standing Poor +   Dynamic Standing   (unable to safely assess at this time)   Endurance Deficit   Endurance Deficit Yes   Endurance Deficit Description Fatigue upon additional transfer trials, pt  was assisted BTB w/o issue  Activity Tolerance   Activity Tolerance Patient limited by fatigue   Nurse Made Aware yes   Assessment   Prognosis Good   Problem List Decreased strength;Decreased endurance; Impaired balance;Decreased mobility; Decreased safety awareness;Decreased cognition; Impaired judgement; Impaired sensation   Assessment Pt is 80 y o  female seen for PT evaluation s/p admit to [address] [place] [address] on 10/10/2019 w/ Cerebrovascular accident (CVA) due to occlusion of left middle cerebral artery ([facility] )  PT consulted to assess pt's functional mobility and d/c needs  Order placed for PT eval and tx, w/ activity as tolerated order  Comorbidities affecting pt's physical performance at time of assessment include: CVA w/ assessed R sided weakness, HTN,DM, glaucoma, hypokalemia, unresponsive upon arrival  PTA, pt was independent w/ all functional mobility w/ RW  Personal factors affecting pt at time of IE include: communication issues, inability to ambulate household distances, inability to navigate community distances, unable to perform dynamic tasks in community, decreased cognition, positive fall history, unable to perform physical activity, inability to perform ADLs and inability to live alone   Please find objective findings from PT assessment regarding body systems outlined above with impairments and limitations including weakness, impaired balance, decreased endurance, impaired coordination, gait deviations, decreased activity tolerance, decreased functional mobility tolerance, altered sensation, decreased safety awareness, impaired judgement, fall risk and decreased cognition  The following objective measures performed on IE also reveal limitations: Barthel Index: 15/100  Pt's clinical presentation is currently unstable/unpredictable  Pt to benefit from continued PT tx to address deficits as defined above and maximize level of functional independent mobility and consistency  From PT/mobility standpoint, recommendation at time of d/c would be STR pending progress in order to facilitate return to PLOF  Barriers to Discharge Decreased caregiver support   Goals   Patient Goals pt  could not provide at this time 2/2 dysarthria   LTG Expiration Date 10/21/19   Long Term Goal #1 1 )Patient will complete bed mobility with min A of 1 for decrease need for caregiver assistance, decrease burden of care  2 ) Patient will complete transfers with min A  of 1 to decrease risk of falls, facilitate upright standing posture  3 ) RLE strength to greater than/equal to 3/5, LLE to 4-/5  gross musculature to increase ability to safely transfer, control descent to chair  4 ) Patient will exhibit increase static standing balance to Fair+ , for 30-45 seconds without LOB and min A  of 1 to improve activity tolerance & endurance  5 ) Patient will exhibit increase dynamic ambulatory balance to Fair for 25-75 feet w/RW min A of 2 to improve ability to mobilize to toilet, chair and decrease risk for additional medical complications  6 ) Patient will exhibit good self monitoring and ability to follow 2 step commands to increase complexity of tasks and resume ADL's without LOB  PT Treatment Day 1   Plan   Treatment/Interventions LE strengthening/ROM; Functional transfer training; Therapeutic exercise; Endurance training;Cognitive reorientation;Patient/family training;Equipment eval/education; Bed mobility;Gait training;OT  (& neuro re-education w/balance training)   PT Frequency 7x/wk   Recommendation   Recommendation Short-term skilled PT   Equipment Recommended Walker   PT - OK to Discharge Yes  (if medically stable to STR)   Additional Comments Upon conclusion, pt  was resting in bed w/all needs within reach & SCD's active  Barthel Index   Feeding 5   Bathing 0   Grooming Score 0   Dressing Score 0   Bladder Score 0   Bowels Score 5   Toilet Use Score 0   Transfers (Bed/Chair) Score 5   Mobility (Level Surface) Score 0   Stairs Score 0   Barthel Index Score 15     Additional skilled interventions: Therapeutic Activity x 10 minutes  S:Fern nodded upon arrival that she was pain-fee, agreeable to participate in session  O: therapeutic activity x 10 minutes including mobilization education: bed mobility w/ B rolling, supine<->sit, static/dynamic sitting balance w/ postural facilitation, sit<->stand transfers, static/dynamic standing balance w/ postural facilitation, continued safety training, and environmental scanning of R side 2/2 observed neglect  A: Patient exhibited severe weakness R>L, impaired balance, gait dysfunction, decreased endurance, activity intolerance, and decline from PLOF to benefit from continued interventions  P: Continue PT tx with progression of functional tasks as patient can safely tolerate, 7x/week      Bharat , PT

## 2019-10-11 NOTE — PLAN OF CARE
Problem: OCCUPATIONAL THERAPY ADULT  Goal: Performs self-care activities at highest level of function for planned discharge setting  See evaluation for individualized goals  Description  Treatment Interventions: ADL retraining, Visual perceptual retraining, Functional transfer training, UE strengthening/ROM, Endurance training, Cognitive reorientation, Equipment evaluation/education, Neuromuscular reeducation, Compensatory technique education, Fine motor coordination activities          See flowsheet documentation for full assessment, interventions and recommendations  Note:   Limitation: Decreased ADL status, Decreased UE ROM, Decreased UE strength, Decreased endurance, Decreased self-care trans, Decreased high-level ADLs, Decreased fine motor control, Non-func R UE  Prognosis: Fair  Assessment: Pt is a 80 y o  female who was admitted to [address] [place] [address] on 10/10/2019 with Cerebrovascular accident (CVA) due to occlusion of left middle cerebral artery ([facility] )  At this time, patient is also affected by the comorbidities of: HTN, DM2, hyperlipidemia, hashimoto's thyroiditis, and glaucoma  Additionally, patient is affected by the following personal factors:limited home support, difficulty performing ADLS and difficulty performing IADLS   Orders placed for OT evaluation/treatment with up with assistance orders  Prior to admission, Per chart review, patient was independent with ADLs, ambulatory with RW and lived alone in a high rise elderly apartment  Upon OT evaluation, patient requires maximum assist and total assist for UB ADLs and maximum assist and total assist for LB ADLs  Occupational performance is affected by the following deficits: decreased ROM, decreased strength, decreased balance, decreased tolerance, impaired GMC and impaired [facility]   Based on the following information, patient would benefit from continued skilled OT treatment while in the hospital to address deficits and maximize level of functional independence with ADL's and functional mobility  Occupational Performance areas to address include: eating, grooming, bathing/shower, toilet hygiene, dressing, medication management, functional mobility, clothing management, meal prep and household maintenance  From OT standpoint, recommendation at time of d/c would be short term rehab       OT Discharge Recommendation: Short Term Rehab  OT - OK to Discharge: Yes(Once medically cleared)     Abbi Crump OT

## 2019-10-11 NOTE — DISCHARGE SUMMARY
Discharge- Chica Liz 10/10/1928, 80 y o  female MRN: [id number]    Unit/Bed#: -01 Encounter: [id number]    Primary Care Provider: Tee Oliva DO   Date and time admitted to hospital: 10/11/2019  5:08 PM        * Hospice care patient  Assessment & Plan  · Patient placed on comfort care after discussion with patient's niece Raina Willson    Cerebrovascular accident (CVA) due to occlusion of left middle cerebral artery [facility])  Assessment & Plan  · Patient with worsening symptoms  Will transition to hospice  Essential hypertension, benign  Assessment & Plan  · Patient remains NPO secondary to stroke    Type 2 diabetes mellitus without complication, without long-term current use of insulin ([facility] )  Assessment & Plan    Lab Results   Component Value Date    HGBA1C 8 0 (H) 10/10/2019   ·  We will discontinue Accu-Cheks and insulin    Mixed hyperlipidemia  Assessment & Plan  · Stop statin    Other specified glaucoma  Assessment & Plan  · Stop eye drops    Hypokalemia  Assessment & Plan  · Discontinue any supplementation      Discharging Physician / Practitioner: CASSI Peguero  PCP: Tee Oliva DO  Admission Date:   Discharge Date: 10/11/19    Resolved Problems  Date Reviewed: 10/11/2019    None          Consultations During Hospital Stay:  · Neurology  · PT/OT    Procedures Performed:   · CT Head with hypodense area in the left frontal and insular region representing 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 There is no mass effect and there is no acute hemorrhage  · CXR: no acute cardiopulmonary disease       Significant Findings / Test Results:   · Worsening stroke symptoms    Incidental Findings:   · none     Test Results Pending at Discharge (will require follow up):   · none     Outpatient Tests Requested:  · none    Complications:  Patient will proceed with Hospice    Reason for Admission: Mental status change and stroke    Hospital Course:     Chica Liz is a 80 y o  female patient who originally presented to the hospital on 10/11/2019 due to mental status change in stroke  This patient [facility], she was brought in by ambulance today for minute status change  She was last known well on Tuesday, 10/08/2019  EMS was called and were able to gain access into her home, finding her in bed with the inability to verbally speak  She was awake and could open her eyes      In the emergency department she was given aspirin per rectum, and had consultation with tele neurology  Her niece Indigo Morse was contacted, and verified that the patient is a DNR DNI      Due to the time of her last well known time no tPA was administered      Patient did undergo a CT of the head that did show hypodense area in the left frontal and insular region, acute infarct in superior division of left MCA  She will have an MRI and echocardiogram       Patient's NIH stroke scale = 13  On admission     Patient does have dysarthria, however, when her niece asked her you know your in the hospital right?", the patient stated of course   However, the next question of tell me what year name is, the patient was only able to mumble and not produce any understandable words  She does answer yes and no questions appropriately  Please see above list of diagnoses and related plan for additional information  Condition at Discharge: transfer to Hospice  Discharge Day Visit / Exam:     * Please refer to separate progress note for these details *    Discussion with Family: discussed with morena    Discharge instructions/Information to patient and family:   See after visit summary for information provided to patient and family  Provisions for Follow-Up Care:  See after visit summary for information related to follow-up care and any pertinent home health orders        Disposition:  Other: Hospice    For Discharges to [facility] SNF:   · Not Applicable to this Patient - Not Applicable to this Patient    Planned Readmission: no     Discharge Statement:  I spent 35 minutes discharging the patient  This time was spent on the day of discharge  I had direct contact with the patient on the day of discharge  Greater than 50% of the total time was spent examining patient, answering all patient questions, arranging and discussing plan of care with patient as well as directly providing post-discharge instructions  Additional time then spent on discharge activities  Discharge Medications:  See after visit summary for reconciled discharge medications provided to patient and family        ** Please Note: This note has been constructed using a voice recognition system **

## 2019-10-11 NOTE — ASSESSMENT & PLAN NOTE
Lab Results   Component Value Date    HGBA1C 8 0 (H) 10/10/2019   ·  We will discontinue Accu-Cheks and insulin

## 2019-10-11 NOTE — PLAN OF CARE
Problem: PHYSICAL THERAPY ADULT  Goal: Performs mobility at highest level of function for planned discharge setting  See evaluation for individualized goals  Description  Treatment/Interventions: LE strengthening/ROM, Functional transfer training, Therapeutic exercise, Endurance training, Cognitive reorientation, Patient/family training, Equipment eval/education, Bed mobility, Gait training, OT(& neuro re-education w/balance training)  Equipment Recommended: Mat Prader    See flowsheet documentation for full assessment, interventions and recommendations  Denia Amado, PT     Note:   Prognosis: Good  Problem List: Decreased strength, Decreased endurance, Impaired balance, Decreased mobility, Decreased safety awareness, Decreased cognition, Impaired judgement, Impaired sensation  Assessment: Pt is 80 y o  female seen for PT evaluation s/p admit to [address] [place] [address] on 10/10/2019 w/ Cerebrovascular accident (CVA) due to occlusion of left middle cerebral artery ([facility] )  PT consulted to assess pt's functional mobility and d/c needs  Order placed for PT eval and tx, w/ activity as tolerated order  Comorbidities affecting pt's physical performance at time of assessment include: CVA w/ assessed R sided weakness, HTN,DM, glaucoma, hypokalemia, unresponsive upon arrival  PTA, pt was independent w/ all functional mobility w/ RW  Personal factors affecting pt at time of IE include: communication issues, inability to ambulate household distances, inability to navigate community distances, unable to perform dynamic tasks in community, decreased cognition, positive fall history, unable to perform physical activity, inability to perform ADLs and inability to live alone   Please find objective findings from PT assessment regarding body systems outlined above with impairments and limitations including weakness, impaired balance, decreased endurance, impaired coordination, gait deviations, decreased activity tolerance, decreased functional mobility tolerance, altered sensation, decreased safety awareness, impaired judgement, fall risk and decreased cognition  The following objective measures performed on IE also reveal limitations: Barthel Index: 15/100  Pt's clinical presentation is currently unstable/unpredictable  Pt to benefit from continued PT tx to address deficits as defined above and maximize level of functional independent mobility and consistency  From PT/mobility standpoint, recommendation at time of d/c would be STR pending progress in order to facilitate return to PLOF  Barriers to Discharge: Decreased caregiver support     Recommendation: Short-term skilled PT     PT - OK to Discharge: Yes(if medically stable to STR)    See flowsheet documentation for full assessment     Brett Mcmullen, PT

## 2019-10-11 NOTE — SOCIAL WORK
Terence Joyner liaison from [address] [place] [address] to evaluate pt his afternoon  CM made pt family aware of same  If pt does not meet hospice GIP criteria she would need to go to SNF under hospice  She is aware pt would have to pay for room and board at SNF if she goes under hospice care  [facility]  She requests [facility] to [facility] for same  Referral sent, CM to follow

## 2019-10-11 NOTE — PHYSICIAN ADVISOR
Current patient class: Inpatient  The patient is currently on Hospital Day: 2 at [address]      The patient was admitted to the hospital at 21  on 10/10/19 for the following diagnosis:  Stroke (cerebrum) ([place] [facility] ) [I63 9]  Unresponsive [R41 89]       There is documentation in the medical record of an expected length of stay of at least 2 midnights  The patient is therefore expected to satisfy the 2 midnight benchmark and given the 2 midnight presumption is appropriate for INPATIENT ADMISSION  Given this expectation of a satisfying stay, CMS instructs us that the patient is most often appropriate for inpatient admission under part A provided medical necessity is documented in the chart  After review of the relevant documentation, labs, vital signs and test results, the patient is appropriate for INPATIENT ADMISSION  Admission to the hospital as an inpatient is a complex decision making process which requires the practitioner to consider the patients presenting complaint, history and physical examination and all relevant testing  With this in mind, in this case, the patient was deemed appropriate for INPATIENT ADMISSION  After review of the documentation and testing available at the time of the admission I concur with this clinical determination of medical necessity  Rationale is as follows: The patient is a 80 yrs old Female who presented to the ED at 10/10/2019 12:30 PM with a chief complaint of Altered Mental Status     The patient is being admitted with change in mental status and stroke  The patient has CVA due to occlusion of left MCA  The patient has an acute infarct in the superior division of left MCA  The plan of care includes echocardiogram, neuro checks, dysphagia assessment, telemetry monitoring  This patient is appropriate for inpatient admission continued hospitalization is necessary to ensure stabilization of her clinical status      The patients vitals on arrival were ED Triage Vitals   Temperature Pulse Respirations Blood Pressure SpO2   10/10/19 1241 10/10/19 1241 10/10/19 1241 10/10/19 1241 10/10/19 1241   97 7 °F (36 5 °C) 76 16 103/51 97 %      Temp Source Heart Rate Source Patient Position - Orthostatic VS BP Location FiO2 (%)   10/10/19 1241 10/10/19 1241 10/10/19 1241 10/10/19 1241 --   Temporal Monitor Lying Left arm       Pain Score       10/10/19 1554       No Pain           Past Medical History:   Diagnosis Date    Diabetes mellitus ([facility] )     non insulin    Disease of thyroid gland     Glaucoma     Hyperlipidemia     Hypertension     Lumbosacral radiculopathy     last assessed: 11/7/2013    Stroke [facility])      Past Surgical History:   Procedure Laterality Date    APPENDECTOMY      HYSTERECTOMY             Consults have been placed to:   IP CONSULT TO HOSPICE    Vitals:    10/11/19 0700 10/11/19 0709 10/11/19 1100 10/11/19 1520   BP: 142/61  149/68 94/58   BP Location: Left arm  Left arm Left arm   Pulse: 83  83 62   Resp: (!) 26   (!) 24   Temp: 98 8 °F (37 1 °C) 98 8 °F (37 1 °C) 98 °F (36 7 °C) 99 2 °F (37 3 °C)   TempSrc: Temporal Tympanic Tympanic Tympanic   SpO2: 95%  97% 90%   Weight:       Height:           Most recent labs:    Recent Labs     10/11/19  0444 10/11/19  0629   WBC 11 60*  --    HGB 11 8*  --    HCT 36 2*  --      --    K  --  4 0   CALCIUM  --  8 7   BUN  --  25   CREATININE  --  0 75   INR  --  1 32   AST  --  24   ALT  --  20   ALKPHOS  --  80       Scheduled Meds:  Current Facility-Administered Medications:  LORazepam 2 mg Intravenous Q1H PRRAHUL Sampson MD   morphine injection 2 mg Intravenous Q1H PRN Mono Sampson MD   scopolamine 1 patch Transdermal Q72H Mono Sampson MD     Continuous Infusions:   PRN Meds:   LORazepam    morphine injection    Surgical procedures (if appropriate):

## 2019-10-11 NOTE — UTILIZATION REVIEW
Initial Clinical Review    Admission: Date/Time/Statement: Inpatient Admission Orders (From admission, onward)     Ordered        10/10/19 1513  Inpatient Admission (expected length of stay for this patient Order details is greater than two midnights)  Once                   Orders Placed This Encounter   Procedures    Inpatient Admission (expected length of stay for this patient Order details is greater than two midnights)     Standing Status:   Standing     Number of Occurrences:   1     Order Specific Question:   Admitting Physician     Answer:   Lily Adams [[id number]]     Order Specific Question:   Level of Care     Answer:   Med Surg [[id number]]     Comments:   Telemetry     Order Specific Question:   Estimated length of stay     Answer:   More than 2 Midnights     Order Specific Question:   Certification     Answer:   I certify that inpatient services are medically necessary for this patient for a duration of greater than two midnights  See H&P and MD Progress Notes for additional information about the patient's course of treatment  ED Arrival Information     Expected Arrival Acuity Means of Arrival Escorted By Service Admission Type    - 10/10/2019 12:28 Emergent Ambulance [facility]FF[facility] WITH [facility] Emergency    Arrival Complaint    unresponsive        Chief Complaint   Patient presents with    Altered Mental Status     Assessment/Plan: 79 y/o female presents to ED from home by EMS with altered mental status  On ED arrival, nonverbal, but opens eyes to verbal stimulus    Admitted as inpatient  Cerebrovascular accident (CVA) due to occlusion of left middle cerebral artery (HCC)  Assessment & Plan  · CT shows hypodense area left frontal and insular region = acute infarct in superior division of left MCA  · Neurology already consulted in emergency department  · MRI  · Echocardiogram  · Neuro checks  · Education  · I&O  · Consult acute rehab and speech and PT OT and nutrition  · Dysphagia assessment  · Check lipid profile, hemoglobin A1c, TSH  · Monitor on telemetry  · Aspirin per rectum  · NIH score = 13      ED Triage Vitals   Temperature Pulse Respirations Blood Pressure SpO2   10/10/19 1241 10/10/19 1241 10/10/19 1241 10/10/19 1241 10/10/19 1241   97 7 °F (36 5 °C) 76 16 103/51 97 %      Temp Source Heart Rate Source Patient Position - Orthostatic VS BP Location FiO2 (%)   10/10/19 1241 10/10/19 1241 10/10/19 1241 10/10/19 1241 --   Temporal Monitor Lying Left arm       Pain Score       10/10/19 1554       No Pain        Wt Readings from Last 1 Encounters:   10/10/19 63 6 kg (140 lb 3 4 oz)     Additional Vital Signs:   10/11/19 1520  99 2 °F (37 3 °C)  62  24Abnormal   94/58  90 %  None (Room air)  Lying   10/11/19 1100  98 °F (36 7 °C)  83    149/68  97 %  None (Room air)  Lying   10/11/19 0709  98 8 °F (37 1 °C)               10/11/19 0700  98 8 °F (37 1 °C)  83  26Abnormal   142/61  95 %  None (Room air)  Lying   10/11/19 0555  97 4 °F (36 3 °C)Abnormal   83  29Abnormal   132/60  94 %  None (Room air)  Lying   10/11/19 0323      24Abnormal            10/11/19 0300  98 5 °F (36 9 °C)  82  29Abnormal   135/62  95 %  None (Room air)  Lying   10/11/19 0100  98 7 °F (37 1 °C)  87  23Abnormal   148/80  95 %  None (Room air)  Lying   10/10/19 2300  98 3 °F (36 8 °C)  80  24Abnormal   102/48Abnormal   93 %  None (Room air)  Lying   10/10/19 2100  97 3 °F (36 3 °C)Abnormal   80  27Abnormal   122/64  93 %  None (Room air)         Pertinent Labs/Diagnostic Test Results:   Results from last 7 days   Lab Units 10/11/19  0444 10/10/19  1318   WBC Thousand/uL 11 60* 14 40*   HEMOGLOBIN g/dL 11 8* 12 1   HEMATOCRIT % 36 2* 36 7*   PLATELETS Thousands/uL 350 340   NEUTROS ABS Thousands/µL 9 60* 11 90*         Results from last 7 days   Lab Units 10/11/19  0629 10/10/19  1318   SODIUM mmol/L 138 135   POTASSIUM mmol/L 4 0 4 3   CHLORIDE mmol/L 108* 102   CO2 mmol/L 18* 24   ANION GAP mmol/L 12 9   BUN mg/dL 25 25 CREATININE mg/dL 0 75 0 82   EGFR ml/min/1 73sq m 70 63   CALCIUM mg/dL 8 7 9 2     Results from last 7 days   Lab Units 10/11/19  0629 10/10/19  1318   AST U/L 24 19   ALT U/L 20 17   ALK PHOS U/L 80 78   TOTAL PROTEIN g/dL 6 6 7 1   ALBUMIN g/dL 3 0* 3 1*   TOTAL BILIRUBIN mg/dL 0 60 0 60     Results from last 7 days   Lab Units 10/11/19  1127 10/11/19  0558 10/10/19  2019 10/10/19  1620 10/10/19  1314   POC GLUCOSE mg/dl 154* 179* 165* 141* 144*     Results from last 7 days   Lab Units 10/11/19  0629 10/10/19  1318   GLUCOSE RANDOM mg/dL 169* 164*         Results from last 7 days   Lab Units 10/10/19  1729   HEMOGLOBIN A1C % 8 0*   EAG mg/dl 183       Results from last 7 days   Lab Units 10/11/19  0629 10/10/19  1347   PROTIME seconds 15 4* 15 8*   INR  1 32 1 36   PTT seconds  --  31     Results from last 7 days   Lab Units 10/11/19  0444   TSH 3RD GENERATON uIU/mL 3 600     Results from last 7 days   Lab Units 10/11/19  0444   PROCALCITONIN ng/ml 0 72*     Results from last 7 days   Lab Units 10/10/19  1251   LACTIC ACID mmol/L 1 8                                     Results from last 7 days   Lab Units 10/11/19  0036   CLARITY UA  Clear   COLOR UA  Yellow   SPEC GRAV UA  1 025   PH UA  5 0   GLUCOSE UA mg/dl Negative   KETONES UA mg/dl 40 (2+)*   BLOOD UA  Trace-Intact*   PROTEIN UA mg/dl Trace*   NITRITE UA  Negative   BILIRUBIN UA  1+*   UROBILINOGEN UA E U /dl 1 0   LEUKOCYTES UA  Negative   WBC UA /hpf 10-20*   RBC UA /hpf 4-10*   BACTERIA UA /hpf Moderate*   EPITHELIAL CELLS WET PREP /hpf Moderate*   MUCUS THREADS  Moderate*                             Results from last 7 days   Lab Units 10/10/19  1255   BLOOD CULTURE  No Growth at 24 hrs  No Growth at 24 hrs  EKG:  Normal sinus rhythm  Nonspecific T wave abnormality    CXR:  No acute cardiopulmonary disease  CT head:   There are hypodense area in the left frontal and insular region representing 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  There is no mass effect and there is no acute hemorrhage    ED Treatment:   Medication Administration from 10/10/2019 1228 to 10/10/2019 1536       Date/Time Order Dose Route Action Action by Comments     10/10/2019 1505 sodium chloride 0 9 % bolus 2,000 mL 0 mL Intravenous Stopped Felicia Childs RN      10/10/2019 1400 sodium chloride 0 9 % bolus 2,000 mL 1,000 mL Intravenous [address] Felicia Cihu RN      10/10/2019 1505 aspirin rectal suppository 300 mg 300 mg Rectal Given Felicia Childs RN rectally     10/10/2019 1504 sodium chloride 0 9 % infusion 125 mL/hr Intravenous New Bag Felicia Childs RN         Past Medical History:   Diagnosis Date    Diabetes mellitus ([facility] [address] )     non insulin    Disease of thyroid gland     Glaucoma     Hyperlipidemia     Hypertension     Lumbosacral radiculopathy     last assessed: 11/7/2013    Stroke [facility])      Present on Admission:   Cerebrovascular accident (CVA) due to occlusion of left middle cerebral artery ([address] )   Essential hypertension, benign   Type 2 diabetes mellitus without complication, without long-term current use of insulin (HCC)   Mixed hyperlipidemia   Hashimoto's thyroiditis   Other specified glaucoma      Admitting Diagnosis: Stroke (cerebrum) ([address] ) [I63 9]  Unresponsive [R41 89]  Age/Sex: 80 y o  female  Admission Orders:    Medications:  scopolamine 1 patch Transdermal Q72H          LORazepam 2 mg Intravenous Q1H PRN   morphine injection 2 mg Intravenous Q1H PRN       IP CONSULT TO Muriel Suarez Utilization Review Department  Phone: [phone]; Fax [phone]  [email or web address]  ATTENTION: Please call with any questions or concerns to [phone]  and carefully listen to the prompts so that you are directed to the right person  Send all requests for admission clinical reviews, approved or denied determinations and any other requests to fax [phone]   All voicemails are confidential

## 2019-10-11 NOTE — ASSESSMENT & PLAN NOTE
Lab Results   Component Value Date    HGBA1C 8 0 (H) 10/10/2019       Recent Labs     10/10/19  1314 10/10/19  1620 10/10/19  2019 10/11/19  0558   POCGLU 144* 141* 165* 179*       Blood Sugar Average: Last 72 hrs:  (P) 157 25   · No further Accu-Cheks, and/or treatment

## 2019-10-11 NOTE — QUICK NOTE
Notified by nursing, patient meets sepsis criteria, has elevated wbc's and is tac hypnic  She is afebrile  UA has not been obtained yet  Will hold for antibiotics and monitor  Patient is incontinent of urine  Will strait cath for urine study  Lactic acid was normal and will send pro calcitonin

## 2019-10-11 NOTE — ASSESSMENT & PLAN NOTE
· Patient placed on comfort care after discussion with patient's niece Merly Ala  · Will continue with prn medications

## 2019-10-11 NOTE — NURSING NOTE
Urine sample obtained via straight cath  Patient tolerated procedure well   Urine sample sent to the lab for U A  review

## 2019-10-11 NOTE — ASSESSMENT & PLAN NOTE
· I had a long discussion with the patient's niece/power of  Ra Vyas at phone number [phone]  We reviewed everything that has happened for the patient thus far  We reviewed the patient's current clinical status  Patient remains aphasic, does not move her bilateral lower extremities, does not move her right upper extremity and only spontaneously moves her left upper extremity  Patient has noted to be a little more tachypneic and tachycardic this morning by nursing  At times patient is noted to have significant periods of tachypnea with shallow breathing  After reviewing all of her comorbid medical conditions, her current age of 80 and the poor prognosis from a quality of life standpoint, a decision has been made to withdraw care  There will be no further testing, treatment and/or imaging  All medications can be discontinued  We will consult hospice and make the patient as comfortable as possible in the interim  Care has been withdrawn  The decision were reiterated to Dr Laurie Mendosa by Salas Mraiano    · Once again, withdraw care, implement comfort measures and consult hospice

## 2019-10-11 NOTE — NURSING NOTE
Notified hospitalist that patient meets sepsis criteria  Patient has elevated wbc's and is tachypnic  UA has not been obtained yet due to patient being incontinent of urine  Will strait cath for urine study

## 2019-10-11 NOTE — PROGRESS NOTES
Progress Note Alba Landaverde 10/10/1928, 80 y o  female MRN: [id number]    Unit/Bed#: -01 Encounter: [id number]    Primary Care Provider: Dorma Mohs, DO   Date and time admitted to hospital: 10/10/2019 12:30 PM        * End of life care  Assessment & Plan  · I had a long discussion with the patient's niece/power of  Jake Baeza at phone number [phone]  We reviewed everything that has happened for the patient thus far  We reviewed the patient's current clinical status  Patient remains aphasic, does not move her bilateral lower extremities, does not move her right upper extremity and only spontaneously moves her left upper extremity  Patient has noted to be a little more tachypneic and tachycardic this morning by nursing  At times patient is noted to have significant periods of tachypnea with shallow breathing  After reviewing all of her comorbid medical conditions, her current age of 80 and the poor prognosis from a quality of life standpoint, a decision has been made to withdraw care  There will be no further testing, treatment and/or imaging  All medications can be discontinued  We will consult hospice and make the patient as comfortable as possible in the interim  Care has been withdrawn  The decision were reiterated to Dr Jimbo Lafleur by Mitesh Burrell    · Once again, withdraw care, implement comfort measures and consult hospice    Cerebrovascular accident (CVA) due to occlusion of left middle cerebral artery (HCC)  Assessment & Plan  · No further workup, testing and/or treatment  · See above    Essential hypertension, benign  Assessment & Plan  · No further testing, treatment and/or workup    Hashimoto's thyroiditis  Assessment & Plan  No further testing, treatment and/or workup    Mixed hyperlipidemia  Assessment & Plan  No further testing, treatment and/or workup    Other specified glaucoma  Assessment & Plan  No further testing, treatment and/or workup    Type 2 diabetes mellitus without complication, without long-term current use of insulin [facility])  Assessment & Plan  Lab Results   Component Value Date    HGBA1C 8 0 (H) 10/10/2019       Recent Labs     10/10/19  1314 10/10/19  1620 10/10/19  2019 10/11/19  0558   POCGLU 144* 141* 165* 179*       Blood Sugar Average: Last 72 hrs:  (P) 157 25   · No further Accu-Cheks, and/or treatment        VTE Pharmacologic Prophylaxis: Pharmacologic: No VTE prophylaxis is indicated in this patient at the end of her life on full-blown comfort measures    Patient Centered Rounds: I have performed bedside rounds with nursing staff today  Discussions with Specialists or Other Care Team Provider:  Case management, nursing and pharmacy  Education and Discussions with Family / Patient:  Patient's niece Maria Luisa Arcos was brought up to par with the plan of care for today, please see above for the full details    Current Length of Stay: 1 day(s)    Current Patient Status: Inpatient   Certification Statement: The patient will continue to require additional inpatient hospital stay due to Continued end of life care    Discharge Plan:  No discharge planning as of yet    Code Status: Level 4 - Comfort Care    Subjective:   Patient seen and examined  Patient appears to be in moderate respiratory distress  Patient remains aphasic, and does not have the ability to move her bilateral lower extremities and or her right upper extremity  She spontaneously moves her left upper extremity  She attempts to follow simple 1 step commands but cannot complete them    Objective:     Vitals:   Temp (24hrs), Av °F (36 7 °C), Min:97 1 °F (36 2 °C), Max:98 8 °F (37 1 °C)    Temp:  [97 1 °F (36 2 °C)-98 8 °F (37 1 °C)] 98 8 °F (37 1 °C)  HR:  [68-88] 83  Resp:  [16-29] 26  BP: (102-148)/(48-80) 142/61  SpO2:  [91 %-98 %] 95 %  Body mass index is 24 84 kg/m²  Input and Output Summary (last 24 hours):        Intake/Output Summary (Last 24 hours) at 10/11/2019 1021  Last data filed at 10/10/2019 2109  Gross per 24 hour   Intake 3000 ml   Output    Net 3000 ml       Physical Exam:     Physical Exam   Constitutional: She is oriented to person, place, and time  She appears well-developed and well-nourished  HENT:   Head: Normocephalic and atraumatic  Nose: Nose normal    Mouth/Throat: Oropharynx is clear and moist    Eyes: Pupils are equal, round, and reactive to light  Conjunctivae and EOM are normal    Neck: Normal range of motion  Neck supple  No JVD present  No thyromegaly present  Cardiovascular: Intact distal pulses  Exam reveals no gallop and no friction rub  No murmur heard  S1 plus S2 tachycardic   Pulmonary/Chest: No respiratory distress  Increased respiratory effort  Positive use of the accessory muscles of respiration  Decreased breath sounds bilaterally at the bases  Positive shallow breathing   Abdominal: Soft  Bowel sounds are normal  She exhibits no distension and no mass  There is no tenderness  There is no guarding  Musculoskeletal: Normal range of motion  She exhibits no edema  Lymphadenopathy:     She has no cervical adenopathy  Neurological: She is alert and oriented to person, place, and time  No cranial nerve deficit  Skin: Skin is warm  No rash noted  No erythema  Psychiatric: She has a normal mood and affect  Her behavior is normal    Vitals reviewed        Additional Data:     Labs:    Results from last 7 days   Lab Units 10/11/19  0444   WBC Thousand/uL 11 60*   HEMOGLOBIN g/dL 11 8*   HEMATOCRIT % 36 2*   PLATELETS Thousands/uL 350   NEUTROS PCT % 83*   LYMPHS PCT % 10*   MONOS PCT % 6   EOS PCT % 1     Results from last 7 days   Lab Units 10/11/19  0629   POTASSIUM mmol/L 4 0   CHLORIDE mmol/L 108*   CO2 mmol/L 18*   BUN mg/dL 25   CREATININE mg/dL 0 75   CALCIUM mg/dL 8 7   ALK PHOS U/L 80   ALT U/L 20   AST U/L 24     Results from last 7 days   Lab Units 10/11/19  0629   INR  1 32     Results from last 7 days   Lab Units 10/11/19  0558 10/10/19  2019 10/10/19  1620 10/10/19  1314   POC GLUCOSE mg/dl 179* 165* 141* 144*     Results from last 7 days   Lab Units 10/10/19  1729   HEMOGLOBIN A1C % 8 0*       * I Have Reviewed All Lab Data Listed Above  * Additional Pertinent Lab Tests Reviewed: [address] [address] Admission  Reviewed    Imaging:  Imaging Reports Reviewed Today Include:  CT brain-acute CVA    Recent Cultures (last 7 days):           Last 24 Hours Medication List:     Current Facility-Administered Medications:  LORazepam 2 mg Intravenous Q1H PRN Sly Mac MD   morphine injection 2 mg Intravenous Q1H PRN Sly Mac MD   scopolamine 1 patch Transdermal Roula Salcido MD        Today, Patient Was Seen By: Sly Mac MD    ** Please Note: Dictation voice to text software may have been used in the creation of this document   **

## 2019-10-11 NOTE — PLAN OF CARE
Problem: Prexisting or High Potential for Compromised Skin Integrity  Goal: Skin integrity is maintained or improved  Description  INTERVENTIONS:  - Identify patients at risk for skin breakdown  - Assess and monitor skin integrity  - Assess and monitor nutrition and hydration status  - Monitor labs   - Assess for incontinence   - Turn and reposition patient  - Assist with mobility/ambulation  - Relieve pressure over bony prominences  - Avoid friction and shearing  - Provide appropriate hygiene as needed including keeping skin clean and dry  - Evaluate need for skin moisturizer/barrier cream  - Collaborate with interdisciplinary team   - Patient/family teaching  - Consider wound care consult   Outcome: Progressing     Problem: PAIN - ADULT  Goal: Verbalizes/displays adequate comfort level or baseline comfort level  Description  Interventions:  - Encourage patient to monitor pain and request assistance  - Assess pain using appropriate pain scale  - Administer analgesics based on type and severity of pain and evaluate response  - Implement non-pharmacological measures as appropriate and evaluate response  - Consider cultural and social influences on pain and pain management  - Notify physician/advanced practitioner if interventions unsuccessful or patient reports new pain  Outcome: Progressing     Problem: INFECTION - ADULT  Goal: Absence or prevention of progression during hospitalization  Description  INTERVENTIONS:  - Assess and monitor for signs and symptoms of infection  - Monitor lab/diagnostic results  - Monitor all insertion sites, i e  indwelling lines, tubes, and drains  - Monitor endotracheal if appropriate and nasal secretions for changes in amount and color  - [place] appropriate cooling/warming therapies per order  - Administer medications as ordered  - Instruct and encourage patient and family to use good hand hygiene technique  - Identify and instruct in appropriate isolation precautions for identified infection/condition  Outcome: Progressing     Problem: SAFETY ADULT  Goal: Patient will remain free of falls  Description  INTERVENTIONS:  - Assess patient frequently for physical needs  -  Identify cognitive and physical deficits and behaviors that affect risk of falls    -  [place] fall precautions as indicated by assessment   - Educate patient/family on patient safety including physical limitations  - Instruct patient to call for assistance with activity based on assessment  - Modify environment to reduce risk of injury  - Consider OT/PT consult to assist with strengthening/mobility  Outcome: Progressing  Goal: Maintain or return to baseline ADL function  Description  INTERVENTIONS:  -  Assess patient's ability to carry out ADLs; assess patient's baseline for ADL function and identify physical deficits which impact ability to perform ADLs (bathing, care of mouth/teeth, toileting, grooming, dressing, etc )  - Assess/evaluate cause of self-care deficits   - Assess range of motion  - Assess patient's mobility; develop plan if impaired  - Assess patient's need for assistive devices and provide as appropriate  - Encourage maximum independence but intervene and supervise when necessary  - Involve family in performance of ADLs  - Assess for home care needs following discharge   - Consider OT consult to assist with ADL evaluation and planning for discharge  - Provide patient education as appropriate  Outcome: Progressing  Goal: Maintain or return mobility status to optimal level  Description  INTERVENTIONS:  - Assess patient's baseline mobility status (ambulation, transfers, stairs, etc )    - Identify cognitive and physical deficits and behaviors that affect mobility  - Identify mobility aids required to assist with transfers and/or ambulation (gait belt, sit-to-stand, lift, walker, cane, etc )  - [place] fall precautions as indicated by assessment  - Record patient progress and toleration of activity level on Mobility SBAR; progress patient to next Phase/Stage  - Instruct patient to call for assistance with activity based on assessment  - Consider rehabilitation consult to assist with strengthening/weightbearing, etc   Outcome: Progressing     Problem: DISCHARGE PLANNING  Goal: Discharge to home or other facility with appropriate resources  Description  INTERVENTIONS:  - Identify barriers to discharge w/patient and caregiver  - Arrange for needed discharge resources and transportation as appropriate  - Identify discharge learning needs (meds, wound care, etc )  - Arrange for interpretive services to assist at discharge as needed  - Refer to Case Management Department for coordinating discharge planning if the patient needs post-hospital services based on physician/advanced practitioner order or complex needs related to functional status, cognitive ability, or social support system  Outcome: Progressing     Problem: Knowledge Deficit  Goal: Patient/family/caregiver demonstrates understanding of disease process, treatment plan, medications, and discharge instructions  Description  Complete learning assessment and assess knowledge base    Interventions:  - Provide teaching at level of understanding  - Provide teaching via preferred learning methods  Outcome: Progressing

## 2019-10-11 NOTE — OCCUPATIONAL THERAPY NOTE
Occupational Therapy Evaluation      Terranceargelia Feldmanall    10/11/2019    Patient Active Problem List   Diagnosis    Essential hypertension, benign    Type 2 diabetes mellitus without complication, without long-term current use of insulin ([facility] [address] )    Mixed hyperlipidemia    Hashimoto's thyroiditis    Hypokalemia    Viral syndrome    Cerebrovascular accident (CVA) due to occlusion of left middle cerebral artery (HCC)    Other specified glaucoma       Past Medical History:   Diagnosis Date    Diabetes mellitus ([address] )     non insulin    Disease of thyroid gland     Glaucoma     Hyperlipidemia     Hypertension     Lumbosacral radiculopathy     last assessed: 11/7/2013    Stroke [facility])        Past Surgical History:   Procedure Laterality Date    APPENDECTOMY      HYSTERECTOMY          10/11/19 0816   Note Type   Note type Eval only   Restrictions/Precautions   Other Precautions Cognitive; Fall Risk   Pain Assessment   Pain Assessment No/denies pain   Pain Score No Pain   Home Living   Type of Home Apartment  ([place] )   Home Layout One level;Performs ADLs on one level; Able to live on main level with bedroom/bathroom; Access   Bathroom Accessibility Accessible   Home Equipment Walker   Prior Function   Level of Hardy Independent with ADLs and functional mobility   Lives With Alone   ADL Assistance Independent   IADLs Needs assistance  (as per H&P, pt  gave up driving x 1 month ago)   Falls in the last 6 months 1 to 4   Vocational Retired   Comments PLOF and home living information obtained from chart review secondary to patient having dysarthria and only mumbling  Patient is unable to produce understandable words at this time      Lifestyle   Autonomy Per chart review, patient was independent with ADLs, ambulatory with RW and lived alone in a high rise elderly apartment   Reciprocal Relationships neice    ADL   Grooming Assistance 3  Moderate Assistance   UB Bathing Assistance 2  Maximal Assistance   LB Bathing Assistance 2  Maximal Assistance   UB Dressing Assistance 1  Total Assistance   LB Dressing Assistance 1  Total Assistance   Toileting Assistance  1  Total Assistance   Bed Mobility   Rolling L 3  Moderate assistance   Additional items Assist x 2;HOB elevated; Bedrails; Increased time required;Verbal cues;LE management   Supine to Sit 3  Moderate assistance   Additional items Assist x 2;HOB elevated; Bedrails; Increased time required;Verbal cues;LE management   Sit to Supine 3  Moderate assistance   Additional items Assist x 2; Increased time required;Verbal cues;LE management   Additional Comments Observed decreased R sided acknowledgement throughout session,required verbal cues to encourage environmental scanning and utilization  Transfers   Sit to Stand 3  Moderate assistance   Additional items Assist x 2; Increased time required;Verbal cues   Stand to Sit 3  Moderate assistance   Additional items Assist x 2; Increased time required;Verbal cues   Balance   Static Sitting Fair   Dynamic Sitting Fair -   Static Standing Poor +   Activity Tolerance   Activity Tolerance Treatment limited secondary to medical complications (Comment)  (dysarthria, R sided weakness )   Nurse Made Aware RN made aware of outcomes    RUE Assessment   RUE Assessment X  (Full PROM, no AROM noted at this time )   LUE Assessment   LUE Assessment WFL  (Full AROM, grossly 3/5 MMT)   Hand Function   Gross Motor Coordination Impaired  (decreased R side coordination )   Fine Motor Coordination Impaired  (decreased R hand FMC)   Sensation   Additional Comments Pt shook her head yes that she could feel sensation on BUEs and the sensation felt the same on both sides    Vision-Basic Assessment   Current Vision Wears glasses all the time  (Glasses noted to be at bedside)   Vision - Complex Assessment   Acuity Able to read employee name badge without difficulty  (pt nodded yes that she could read badge)   Perception   Inattention/Neglect Cues to attend right visual field;Cues to attend to right side of body   Cognition   Overall Cognitive Status Unable to assess  (2/2 dysarthria)   Orientation Level Unable to assess   Memory Unable to assess   Following Commands Follows one step commands with increased time or repetition   Assessment   Limitation Decreased ADL status; Decreased UE ROM; Decreased UE strength;Decreased endurance;Decreased self-care trans;Decreased high-level ADLs; Decreased fine motor control;Non-func R UE   Prognosis Fair   Assessment Pt is a 80 y o  female who was admitted to [address] [place] [address] on 10/10/2019 with Cerebrovascular accident (CVA) due to occlusion of left middle cerebral artery ([facility] [address] )  At this time, patient is also affected by the comorbidities of: HTN, DM2, hyperlipidemia, hashimoto's thyroiditis, and glaucoma  Additionally, patient is affected by the following personal factors:limited home support, difficulty performing ADLS and difficulty performing IADLS   Orders placed for OT evaluation/treatment with up with assistance orders  Prior to admission, Per chart review, patient was independent with ADLs, ambulatory with RW and lived alone in a high rise elderly apartment  Upon OT evaluation, patient requires maximum assist and total assist for UB ADLs and maximum assist and total assist for LB ADLs  Occupational performance is affected by the following deficits: decreased ROM, decreased strength, decreased balance, decreased tolerance, impaired GMC and impaired [facility]  Based on the following information, patient would benefit from continued skilled OT treatment while in the hospital to address deficits and maximize level of functional independence with ADL's and functional mobility  Occupational Performance areas to address include: eating, grooming, bathing/shower, toilet hygiene, dressing, medication management, functional mobility, clothing management, meal prep and household maintenance   From OT standpoint, recommendation at time of d/c would be short term rehab  Goals   Patient Goals no goals stated at this time 2/2 dysarthria   Plan   Treatment Interventions ADL retraining;Visual perceptual retraining;Functional transfer training;UE strengthening/ROM; Endurance training;Cognitive reorientation;Equipment evaluation/education; Neuromuscular reeducation; Compensatory technique education; Fine motor coordination activities   Goal Expiration Date 10/21/19   OT Treatment Day 0   OT Frequency 3-5x/wk   Recommendation   OT Discharge Recommendation Short Term Rehab   OT - OK to Discharge Yes  (Once medically cleared)   Barthel Index   Feeding 5   Bathing 0   Grooming Score 0   Dressing Score 0   Bladder Score 0   Bowels Score 5   Toilet Use Score 0   Transfers (Bed/Chair) Score 5   Mobility (Level Surface) Score 0   Stairs Score 0   Barthel Index Score 15     GOALS:    Pt will achieve the following within specified time frame: STG  Pt will achieve the following goals within 5 days    *ADL transfers with Min (A) for inc'd independence with ADLs/purposeful tasks    *UB ADL with Min (A) for inc'd independence with self cares    *LB ADL with Min (A) using AE prn for inc'd independence with self cares    *Toileting with Min (A) for clothing management and hygiene for return to PLOF with personal care    *Increase static stand balance to fair and dyn stand balance to fair- for inc'd safety with standing purposeful tasks    *Increase stand tolerance x3 m for inc'd tolerance with standing purposeful tasks    *Participate in 10m UE therex to increase overall stamina/activity tolerance for purposeful tasks    *Bed mobility- Min (A) for inc'd independence to manage own comfort and initiate EOB & OOB purposeful tasks    *Patient will verbalize 3 safety awareness/ principles to prevent falls in the home setting       *Patient will verbalize and demonstrate use of energy conservation/deep breathing techniques and work simplification skills during functional activities with no verbal cues  Pt will achieve the following within specified time frame: LTG  Pt will achieve the following goals within 10 days    *ADL transfers with (S) for inc'd independence with ADLs/purposeful tasks    *Self Feeding- (I) for inc'd independence with providing self nourishment    *UB ADL with (S) for inc'd independence with self cares    *LB ADL with (S) using AE prn for inc'd independence with self cares    *Toileting with (S) for clothing management and hygiene for return to PLOF with personal care    *Increase static stand balance to fair+ and dyn stand balance to fair for inc'd safety with standing purposeful tasks    *Increase stand tolerance x6 m for inc'd tolerance with standing purposeful tasks    *Bed mobility- (S) for inc'd independence to manage own comfort and initiate EOB & OOB purposeful tasks    *Patient will increase OOB/sitting tolerance to 2-4 hours per day to increase participation in self-care and leisure tasks with no s/s of exertion           Anjum Alfaro MS, OTR/L

## 2019-10-11 NOTE — SOCIAL WORK
Hospice consult received  Pt for hospice consult per Dr Stephania Moss after long discussion with pt shila Coffey  CM spoke with Estrella Lui as well  Pt was completely indpendent and living in the [facility] prior to her admission  She is in agreement with hospice consult at this time  A post acute care recommendation was made by your care team for [place] [facility]  Discussed Freedom of Choice with POA  List of agencies given to POA via in person  POA aware the list is custom filtered for them by preference  and that [facility]'s post acute providers are designated  She chose [phone]  Referal was sent via [employer] and message left for THE [facility] liaison to make her aware of same  CM will continue to follow

## 2019-10-12 LAB — BACTERIA UR CULT: NORMAL

## 2019-10-12 NOTE — ASSESSMENT & PLAN NOTE
· Patient placed on comfort care after discussion with patient's niece Jerald Neville  · Will continue with prn medications

## 2019-10-12 NOTE — DISCHARGE SUMMARY
Discharge Summary - Kathryn Zambrano 80 y o  female MRN: [id number]    Unit/Bed#: -01 Encounter: [id number] PCP: Harshal Morris DO    Admission Date: 10/11/2019    Admitting Diagnosis: Palliative care patient [Z51 5]    HPI:  Patient admitted on 10/10/2019 with CVA  Was CT showed hypodense area left frontal and insular region  Patient was evaluated by Neurology, status was monitored in failed to improve and has been transitioned to hospice care  Procedures Performed: No orders of the defined types were placed in this encounter  Summary of Hospital Course:  Patient was admitted on 10/10/2019 after last known well time of Tuesday 10/08/2019 she was found to have a hypodense area in the left CT with acute MCA CVA  She failed to improve and family elected to pursue hospice care  Please see details hospitalization for H and P and discharge summary dictated by CASSI Perkins on 10/10 and 10/11 respectively  Significant Findings, Care, Treatment and Services Provided:  None    Complications:  Death    Disposition:      Final Diagnosis:  Acute left MCA CVA    Resolved Problems  Date Reviewed: 10/11/2019    None          Condition at Time of Death:  Patient had no spontaneous respirations, breath sounds were absent heart sounds were absent    Date, Time and Cause of Death    Date of Death:  10/11/19  Time of Death:   8:43 PM  Preliminary Cause of Death:  Acute ischemic left MCA stroke ([facility] [address] )  Entered by: Eufemia Jones PA-C[[id number]]     Attribution     AI1  [address], [place] 10/11/19 21:43

## 2019-10-12 NOTE — DEATH NOTE
INPATIENT DEATH NOTE  Kathryn Zambrano 80 y o  female MRN: [id number]  Unit/Bed#: -01 Encounter: [id number]             PHYSICAL EXAM:  Unresponsive to noxious stimuli, Spontaneous respirations absent, Breath sounds absent and Heart sounds absent    Medical Examiner notification criteria:  NONE APPLICABLE   Medical Examiner's office notified?:  No, does not meet ME notification criteria            Autopsy Options:  Post-mortem examination declined by next of kin    Primary Service Attending Physician notified?:  yes - Attending:  Marci Grayson MD    Physician/Resident responsible for completing Discharge Summary:  Eufemia Jones PA-C

## 2019-10-15 LAB
BACTERIA BLD CULT: NORMAL
BACTERIA BLD CULT: NORMAL